# Patient Record
Sex: FEMALE | Race: WHITE | Employment: FULL TIME
[De-identification: names, ages, dates, MRNs, and addresses within clinical notes are randomized per-mention and may not be internally consistent; named-entity substitution may affect disease eponyms.]

---

## 2017-10-29 ENCOUNTER — HEALTH MAINTENANCE LETTER (OUTPATIENT)
Age: 37
End: 2017-10-29

## 2018-06-12 ENCOUNTER — RESULT FOLLOW UP (OUTPATIENT)
Dept: FAMILY MEDICINE | Facility: CLINIC | Age: 38
End: 2018-06-12

## 2018-06-12 ENCOUNTER — OFFICE VISIT (OUTPATIENT)
Dept: FAMILY MEDICINE | Facility: CLINIC | Age: 38
End: 2018-06-12
Payer: COMMERCIAL

## 2018-06-12 VITALS
HEART RATE: 71 BPM | BODY MASS INDEX: 24.43 KG/M2 | SYSTOLIC BLOOD PRESSURE: 107 MMHG | DIASTOLIC BLOOD PRESSURE: 72 MMHG | OXYGEN SATURATION: 98 % | HEIGHT: 66 IN | WEIGHT: 152 LBS | TEMPERATURE: 98.2 F

## 2018-06-12 DIAGNOSIS — F41.9 ANXIETY: ICD-10-CM

## 2018-06-12 DIAGNOSIS — Z00.00 ROUTINE GENERAL MEDICAL EXAMINATION AT A HEALTH CARE FACILITY: Primary | ICD-10-CM

## 2018-06-12 DIAGNOSIS — R87.810 CERVICAL HIGH RISK HPV (HUMAN PAPILLOMAVIRUS) TEST POSITIVE: Primary | ICD-10-CM

## 2018-06-12 DIAGNOSIS — D50.9 IRON DEFICIENCY ANEMIA, UNSPECIFIED IRON DEFICIENCY ANEMIA TYPE: ICD-10-CM

## 2018-06-12 LAB — HGB BLD-MCNC: 12.4 G/DL (ref 11.7–15.7)

## 2018-06-12 PROCEDURE — 87624 HPV HI-RISK TYP POOLED RSLT: CPT | Performed by: PHYSICIAN ASSISTANT

## 2018-06-12 PROCEDURE — 83540 ASSAY OF IRON: CPT | Performed by: PHYSICIAN ASSISTANT

## 2018-06-12 PROCEDURE — 36415 COLL VENOUS BLD VENIPUNCTURE: CPT | Performed by: PHYSICIAN ASSISTANT

## 2018-06-12 PROCEDURE — 83550 IRON BINDING TEST: CPT | Performed by: PHYSICIAN ASSISTANT

## 2018-06-12 PROCEDURE — 85018 HEMOGLOBIN: CPT | Performed by: PHYSICIAN ASSISTANT

## 2018-06-12 PROCEDURE — 82728 ASSAY OF FERRITIN: CPT | Performed by: PHYSICIAN ASSISTANT

## 2018-06-12 PROCEDURE — G0145 SCR C/V CYTO,THINLAYER,RESCR: HCPCS | Performed by: PHYSICIAN ASSISTANT

## 2018-06-12 PROCEDURE — 90471 IMMUNIZATION ADMIN: CPT | Performed by: PHYSICIAN ASSISTANT

## 2018-06-12 PROCEDURE — 99213 OFFICE O/P EST LOW 20 MIN: CPT | Mod: 25 | Performed by: PHYSICIAN ASSISTANT

## 2018-06-12 PROCEDURE — 90715 TDAP VACCINE 7 YRS/> IM: CPT | Performed by: PHYSICIAN ASSISTANT

## 2018-06-12 PROCEDURE — 99395 PREV VISIT EST AGE 18-39: CPT | Mod: 25 | Performed by: PHYSICIAN ASSISTANT

## 2018-06-12 RX ORDER — ALPRAZOLAM 0.25 MG
0.25 TABLET ORAL 3 TIMES DAILY PRN
Qty: 20 TABLET | Refills: 0 | Status: SHIPPED | OUTPATIENT
Start: 2018-06-12 | End: 2019-04-23

## 2018-06-12 ASSESSMENT — ENCOUNTER SYMPTOMS
CARDIOVASCULAR NEGATIVE: 1
EYES NEGATIVE: 1
COLOR CHANGE: 1
CONSTITUTIONAL NEGATIVE: 1
RESPIRATORY NEGATIVE: 1
MUSCULOSKELETAL NEGATIVE: 1
NEUROLOGICAL NEGATIVE: 1
GASTROINTESTINAL NEGATIVE: 1
PSYCHIATRIC NEGATIVE: 1

## 2018-06-12 NOTE — PROGRESS NOTES
SUBJECTIVE:   CC: Xin Marquez is an 37 year old woman who presents for preventive health visit.     Physical   Annual:     Getting at least 3 servings of Calcium per day::  Yes    Bi-annual eye exam::  NO    Dental care twice a year::  Yes    Sleep apnea or symptoms of sleep apnea::  None    Diet::  Breakfast skipped and Other    Frequency of exercise::  4-5 days/week    Duration of exercise::  45-60 minutes    Taking medications regularly::  Yes    Additional concerns today::  YES              Wanting refill of xanax.  Uses it for travel.  Also having more anxiety and sleep issues.  Can't sleep due to stress.  When she sleeps her anxiety goes down.  Never been on anything else for sleep.    Uses xanax very infrequently unless she is traveling but has tried a few times for sleep and it helps.        Today's PHQ-2 Score:   PHQ-2 ( 1999 Pfizer) 6/9/2018   Q1: Little interest or pleasure in doing things 0   Q2: Feeling down, depressed or hopeless 0   PHQ-2 Score 0   Q1: Little interest or pleasure in doing things Not at all   Q2: Feeling down, depressed or hopeless Not at all   PHQ-2 Score 0       Abuse: Current or Past(Physical, Sexual or Emotional)- No  Do you feel safe in your environment - Yes    Social History   Substance Use Topics     Smoking status: Never Smoker     Smokeless tobacco: Never Used     Alcohol use Yes      Comment: occasional     Alcohol Use 6/9/2018   If you drink alcohol do you typically have greater than 3 drinks per day OR greater than 7 drinks per week? No       Reviewed orders with patient.  Reviewed health maintenance and updated orders accordingly - Yes  Labs reviewed in EPIC    Mammogram not appropriate for this patient based on age.    Pertinent mammograms are reviewed under the imaging tab.  History of abnormal Pap smear: NO - age 30-65 PAP every 5 years with negative HPV co-testing recommended    Reviewed and updated as needed this visit by clinical staff  Tobacco  Allergies   "Meds  Problems  Med Hx  Surg Hx  Fam Hx  Soc Hx          Reviewed and updated as needed this visit by Provider  Allergies  Meds  Problems            Review of Systems   Constitutional: Negative.    HENT: Negative.    Eyes: Negative.    Respiratory: Negative.    Cardiovascular: Negative.    Gastrointestinal: Negative.    Genitourinary: Negative.    Musculoskeletal: Negative.    Skin: Positive for color change (mole on shoulder ).   Neurological: Negative.    Psychiatric/Behavioral: Negative.           OBJECTIVE:   /72  Pulse 71  Temp 98.2  F (36.8  C) (Oral)  Ht 5' 6\" (1.676 m)  Wt 152 lb (68.9 kg)  LMP 05/13/2018  SpO2 98%  BMI 24.53 kg/m2  Physical Exam   Constitutional: She is oriented to person, place, and time. She appears well-developed and well-nourished. No distress.   HENT:   Right Ear: External ear normal.   Left Ear: External ear normal.   Nose: Nose normal.   Mouth/Throat: Oropharynx is clear and moist. No oropharyngeal exudate.   Eyes: Conjunctivae are normal. Pupils are equal, round, and reactive to light. Right eye exhibits no discharge. Left eye exhibits no discharge.   Neck: Neck supple. No tracheal deviation present. No thyromegaly present.   Cardiovascular: Normal rate, regular rhythm, S1 normal, S2 normal, normal heart sounds and normal pulses.  Exam reveals no S3, no S4 and no friction rub.    No murmur heard.  Pulmonary/Chest: Effort normal and breath sounds normal. No respiratory distress. She has no wheezes. She has no rales.   Abdominal: Soft. Bowel sounds are normal. She exhibits no mass. There is no hepatosplenomegaly. There is no tenderness.   Genitourinary: Vagina normal and uterus normal. No breast swelling, tenderness or discharge. Cervix exhibits no motion tenderness and no discharge. No vaginal discharge found.   Musculoskeletal: Normal range of motion. She exhibits no edema.   Lymphadenopathy:     She has no cervical adenopathy.   Neurological: She is alert and " "oriented to person, place, and time. She has normal strength and normal reflexes. She exhibits normal muscle tone.   Skin: Skin is warm and dry. No rash noted.   Psychiatric: She has a normal mood and affect. Judgment and thought content normal. Cognition and memory are normal.         ASSESSMENT/PLAN:   1. Routine general medical examination at a health care facility    - TDAP VACCINE (ADACEL)  - VACCINE ADMINISTRATION, INITIAL  - Pap imaged thin layer screen with HPV - recommended age 30 - 65 years (select HPV order below)  - HPV High Risk Types DNA Cervical    2. Anxiety  Ok to use sparingly for sleep.  If using frequently discussed trying trazodone at night.    - ALPRAZolam (XANAX) 0.25 MG tablet; Take 1 tablet (0.25 mg) by mouth 3 times daily as needed for anxiety  Dispense: 20 tablet; Refill: 0    3. Iron deficiency anemia, unspecified iron deficiency anemia type  Follow up as needed  - Hemoglobin  - Iron and iron binding capacity  - Ferritin    COUNSELING:  Reviewed preventive health counseling, as reflected in patient instructions       Regular exercise       Healthy diet/nutrition         reports that she has never smoked. She has never used smokeless tobacco.    Estimated body mass index is 24.53 kg/(m^2) as calculated from the following:    Height as of this encounter: 5' 6\" (1.676 m).    Weight as of this encounter: 152 lb (68.9 kg).       Counseling Resources:  ATP IV Guidelines  Pooled Cohorts Equation Calculator  Breast Cancer Risk Calculator  FRAX Risk Assessment  ICSI Preventive Guidelines  Dietary Guidelines for Americans, 2010  USDA's MyPlate  ASA Prophylaxis  Lung CA Screening    Makenna Dyer PA-C  [facility]  Answers for HPI/ROS submitted by the patient on 6/9/2018   PHQ-2 Score: 0    "

## 2018-06-12 NOTE — NURSING NOTE
Screening Questionnaire for Adult Immunization    Are you sick today?   No   Do you have allergies to medications, food, a vaccine component or latex?   No   Have you ever had a serious reaction after receiving a vaccination?   No   Do you have a long-term health problem with heart disease, lung disease, asthma, kidney disease, metabolic disease (e.g. diabetes), anemia, or other blood disorder?   No   Do you have cancer, leukemia, HIV/AIDS, or any other immune system problem?   No   In the past 3 months, have you taken medications that affect  your immune system, such as prednisone, other steroids, or anticancer drugs; drugs for the treatment of rheumatoid arthritis, Crohn s disease, or psoriasis; or have you had radiation treatments?   No   Have you had a seizure, or a brain or other nervous system problem?   No   During the past year, have you received a transfusion of blood or blood     products, or been given immune (gamma) globulin or antiviral drug?   No   For women: Are you pregnant or is there a chance you could become        pregnant during the next month?   No   Have you received any vaccinations in the past 4 weeks?   No     Immunization questionnaire answers were all negative.        Per orders of  Gomez , injection of Tdap  given by Shelley Snell. Patient instructed to remain in clinic for 15 minutes afterwards, and to report any adverse reaction to me immediately.       Screening performed by Shelley Snell on 6/12/2018 at 4:32 PM.

## 2018-06-12 NOTE — MR AVS SNAPSHOT
After Visit Summary   6/12/2018    Xin Marquez    MRN: [id number]           Patient Information     Date Of Birth          1980        Visit Information        Provider Department      6/12/2018 3:20 PM Makenna Dyer PA-C [facility]        Today's Diagnoses     Iron deficiency anemia, unspecified iron deficiency anemia type    -  1    Anxiety        Routine general medical examination at a health care facility          Care Instructions      Preventive Health Recommendations  Female Ages 26 - 39  Yearly exam:   See your health care provider every year in order to    Review health changes.     Discuss preventive care.      Review your medicines if you your doctor has prescribed any.    Until age 30: Get a Pap test every three years (more often if you have had an abnormal result).    After age 30: Talk to your doctor about whether you should have a Pap test every 3 years or have a Pap test with HPV screening every 5 years.   You do not need a Pap test if your uterus was removed (hysterectomy) and you have not had cancer.  You should be tested each year for STDs (sexually transmitted diseases), if you're at risk.   Talk to your provider about how often to have your cholesterol checked.  If you are at risk for diabetes, you should have a diabetes test (fasting glucose).  Shots: Get a flu shot each year. Get a tetanus shot every 10 years.   Nutrition:     Eat at least 5 servings of fruits and vegetables each day.    Eat whole-grain bread, whole-wheat pasta and brown rice instead of white grains and rice.    Talk to your provider about Calcium and Vitamin D.     Lifestyle    Exercise at least 150 minutes a week (30 minutes a day, 5 days of the week). This will help you control your weight and prevent disease.    Limit alcohol to one drink per day.    No smoking.     Wear sunscreen to prevent skin cancer.    See your dentist every six months for an exam and  "cleaning.            Follow-ups after your visit        Follow-up notes from your care team     Return in about 1 year (around 6/12/2019) for Physical Exam.      Who to contact     If you have questions or need follow up information about today's clinic visit or your schedule please contact [facility] directly at [phone].  Normal or non-critical lab and imaging results will be communicated to you by MyChart, letter or phone within 4 business days after the clinic has received the results. If you do not hear from us within 7 days, please contact the clinic through [facility] or phone. If you have a critical or abnormal lab result, we will notify you by phone as soon as possible.  Submit refill requests through [employer] or call your pharmacy and they will forward the refill request to us. Please allow 3 business days for your refill to be completed.          Additional Information About Your Visit        MyChart Information     [employer] gives you secure access to your electronic health record. If you see a primary care provider, you can also send messages to your care team and make appointments. If you have questions, please call your primary care clinic.  If you do not have a primary care provider, please call [phone] and they will assist you.        Care EveryWhere ID     This is your Care EveryWhere ID. This could be used by other organizations to access your [place] medical records  [id number]        Your Vitals Were     Pulse Temperature Height Last Period Pulse Oximetry BMI (Body Mass Index)    71 98.2  F (36.8  C) (Oral) 5' 6\" (1.676 m) 05/13/2018 98% 24.53 kg/m2       Blood Pressure from Last 3 Encounters:   06/12/18 107/72   09/16/16 106/61   08/12/15 104/70    Weight from Last 3 Encounters:   06/12/18 152 lb (68.9 kg)   09/16/16 156 lb (70.8 kg)   08/12/15 151 lb (68.5 kg)              We Performed the Following     Ferritin     Hemoglobin     HPV High Risk Types DNA Cervical  "    Iron and iron binding capacity     Pap imaged thin layer screen with HPV - recommended age 30 - 65 years (select HPV order below)     TDAP VACCINE (ADACEL)     VACCINE ADMINISTRATION, INITIAL          Where to get your medicines      Some of these will need a paper prescription and others can be bought over the counter.  Ask your nurse if you have questions.     Bring a paper prescription for each of these medications     ALPRAZolam 0.25 MG tablet          Primary Care Provider Fax #    Physician No Ref-Primary [phone]       No address on file        Equal Access to Services     BEBO LAWSON : Hadii aad ku hadasho Soomaali, waaxda luqadaha, qaybta kaalmada adeegyada, waxay daniin haycarlos trivedi . [facility] [phone].    ATENCIÓN: Si habla español, tiene a bolaños disposición servicios gratuitos de asistencia lingüística. [facility] [phone].    We comply with applicable federal civil rights laws and [place] laws. We do not discriminate on the basis of race, color, national origin, age, disability, sex, sexual orientation, or gender identity.            Thank you!     Thank you for choosing [facility]  for your care. Our goal is always to provide you with excellent care. Hearing back from our patients is one way we can continue to improve our services. Please take a few minutes to complete the written survey that you may receive in the mail after your visit with us. Thank you!             Your Updated Medication List - Protect others around you: Learn how to safely use, store and throw away your medicines at www.disposemymeds.org.          This list is accurate as of 6/12/18  4:25 PM.  Always use your most recent med list.                   Brand Name Dispense Instructions for use Diagnosis    ALPRAZolam 0.25 MG tablet    XANAX    20 tablet    Take 1 tablet (0.25 mg) by mouth 3 times daily as needed for anxiety    Anxiety       IRON PO           ZYRTEC PO

## 2018-06-12 NOTE — LETTER
May 28, 2019      Xin Alma  [address] [address]    Dear ,      At [facility], your health and wellness is our primary concern. That is why we are following up on a positive high risk HPV test from 6/12/18. Your provider had recommended that you have a Pap smear and HPV test completed by 6/12/19. Our records do not show that this has been scheduled.    It is important to complete the follow up that your provider has suggested for you to ensure that there are no worsening changes which may, over time, develop into cancer.      Please contact our office at  [phone] to schedule an appointment for a Pap smear and HPV test at your earliest convenience. If you have questions or concerns, please call the clinic and we will be happy to assist you.    If you have completed the tests outside of [facility], please have the results forwarded to our office. We will update the chart for your primary Physician to review before your next annual physical.     Thank you for choosing [facility]!    Sincerely,      Your [facility] Care Team/shana

## 2018-06-13 LAB
FERRITIN SERPL-MCNC: 56 NG/ML (ref 12–150)
IRON SATN MFR SERPL: 38 % (ref 15–46)
IRON SERPL-MCNC: 100 UG/DL (ref 35–180)
TIBC SERPL-MCNC: 262 UG/DL (ref 240–430)

## 2018-06-14 LAB
COPATH REPORT: NORMAL
PAP: NORMAL

## 2018-06-18 LAB
FINAL DIAGNOSIS: ABNORMAL
HPV HR 12 DNA CVX QL NAA+PROBE: POSITIVE
HPV16 DNA SPEC QL NAA+PROBE: NEGATIVE
HPV18 DNA SPEC QL NAA+PROBE: NEGATIVE
SPECIMEN DESCRIPTION: ABNORMAL
SPECIMEN SOURCE CVX/VAG CYTO: ABNORMAL

## 2018-06-19 PROBLEM — R87.810 CERVICAL HIGH RISK HPV (HUMAN PAPILLOMAVIRUS) TEST POSITIVE: Status: ACTIVE | Noted: 2018-06-12

## 2018-06-19 NOTE — PROGRESS NOTES
6/12/18 NIL pap, + HR HPV (not 16 or 18) Plan: cotest in 1 yr.  5/28/19 Cotest reminder letter sent (rlm)  7/12/19 Pap: NIL/neg HR HPV. Plan cotest in 3 years. Tracking closed/ck

## 2019-04-23 ENCOUNTER — OFFICE VISIT (OUTPATIENT)
Dept: FAMILY MEDICINE | Facility: CLINIC | Age: 39
End: 2019-04-23
Payer: COMMERCIAL

## 2019-04-23 VITALS
BODY MASS INDEX: 24.91 KG/M2 | DIASTOLIC BLOOD PRESSURE: 79 MMHG | OXYGEN SATURATION: 98 % | WEIGHT: 155 LBS | HEIGHT: 66 IN | HEART RATE: 50 BPM | TEMPERATURE: 97.7 F | SYSTOLIC BLOOD PRESSURE: 137 MMHG

## 2019-04-23 DIAGNOSIS — T14.8XXA HEMATOMA OF SKIN: Primary | ICD-10-CM

## 2019-04-23 DIAGNOSIS — F41.9 ANXIETY: ICD-10-CM

## 2019-04-23 DIAGNOSIS — D50.9 IRON DEFICIENCY ANEMIA, UNSPECIFIED IRON DEFICIENCY ANEMIA TYPE: ICD-10-CM

## 2019-04-23 DIAGNOSIS — R14.0 BLOATING: ICD-10-CM

## 2019-04-23 LAB
BASOPHILS # BLD AUTO: 0 10E9/L (ref 0–0.2)
BASOPHILS NFR BLD AUTO: 0.6 %
DIFFERENTIAL METHOD BLD: NORMAL
EOSINOPHIL # BLD AUTO: 0.1 10E9/L (ref 0–0.7)
EOSINOPHIL NFR BLD AUTO: 2.2 %
ERYTHROCYTE [DISTWIDTH] IN BLOOD BY AUTOMATED COUNT: 13.2 % (ref 10–15)
FERRITIN SERPL-MCNC: 56 NG/ML (ref 12–150)
HCT VFR BLD AUTO: 40.9 % (ref 35–47)
HGB BLD-MCNC: 13.6 G/DL (ref 11.7–15.7)
IRON SATN MFR SERPL: 41 % (ref 15–46)
IRON SERPL-MCNC: 115 UG/DL (ref 35–180)
LYMPHOCYTES # BLD AUTO: 1.6 10E9/L (ref 0.8–5.3)
LYMPHOCYTES NFR BLD AUTO: 32.9 %
MCH RBC QN AUTO: 30.5 PG (ref 26.5–33)
MCHC RBC AUTO-ENTMCNC: 33.3 G/DL (ref 31.5–36.5)
MCV RBC AUTO: 92 FL (ref 78–100)
MONOCYTES # BLD AUTO: 0.4 10E9/L (ref 0–1.3)
MONOCYTES NFR BLD AUTO: 7.6 %
NEUTROPHILS # BLD AUTO: 2.8 10E9/L (ref 1.6–8.3)
NEUTROPHILS NFR BLD AUTO: 56.7 %
PLATELET # BLD AUTO: 288 10E9/L (ref 150–450)
RBC # BLD AUTO: 4.46 10E12/L (ref 3.8–5.2)
TIBC SERPL-MCNC: 281 UG/DL (ref 240–430)
WBC # BLD AUTO: 5 10E9/L (ref 4–11)

## 2019-04-23 PROCEDURE — 82728 ASSAY OF FERRITIN: CPT | Performed by: PHYSICIAN ASSISTANT

## 2019-04-23 PROCEDURE — 83516 IMMUNOASSAY NONANTIBODY: CPT | Performed by: PHYSICIAN ASSISTANT

## 2019-04-23 PROCEDURE — 85025 COMPLETE CBC W/AUTO DIFF WBC: CPT | Performed by: PHYSICIAN ASSISTANT

## 2019-04-23 PROCEDURE — 83540 ASSAY OF IRON: CPT | Performed by: PHYSICIAN ASSISTANT

## 2019-04-23 PROCEDURE — 36415 COLL VENOUS BLD VENIPUNCTURE: CPT | Performed by: PHYSICIAN ASSISTANT

## 2019-04-23 PROCEDURE — 99214 OFFICE O/P EST MOD 30 MIN: CPT | Performed by: PHYSICIAN ASSISTANT

## 2019-04-23 PROCEDURE — 83516 IMMUNOASSAY NONANTIBODY: CPT | Mod: 91 | Performed by: PHYSICIAN ASSISTANT

## 2019-04-23 PROCEDURE — 83550 IRON BINDING TEST: CPT | Performed by: PHYSICIAN ASSISTANT

## 2019-04-23 RX ORDER — ALPRAZOLAM 0.25 MG
0.25 TABLET ORAL 3 TIMES DAILY PRN
Qty: 20 TABLET | Refills: 0 | Status: SHIPPED | OUTPATIENT
Start: 2019-04-23 | End: 2021-09-28

## 2019-04-23 ASSESSMENT — MIFFLIN-ST. JEOR: SCORE: 1389.83

## 2019-04-23 NOTE — PROGRESS NOTES
"  SUBJECTIVE:   Xin Marquez is a 38 year old female who presents to clinic today for the following   health issues:    R abdominal bruise,lump and mild pain x 10 days, no know injury   Did do a pilate's class the day before.    Does bruise easily.      Gained weight in last month or so  More night sweats in the last year-seem more related to cycle, worse before period  Feels bloated most times.   Modified diet to help.    Always anemic.  Never had imaging.    Periods are always irregular -sometimes skips periods.    Sometimes gets irregular heart rate.  More recently in the last 5 years.  Skips a beat.    Normal appetite, continues to run regularly.  Has no cardiac symptoms when running.  Has had an echo in past that was normal.              Additional history: as documented    Reviewed  and updated as needed this visit by clinical staff  Tobacco  Allergies  Meds  Med Hx  Surg Hx  Fam Hx  Soc Hx        Reviewed and updated as needed this visit by Provider         Patient Active Problem List   Diagnosis     CARDIOVASCULAR SCREENING; LDL GOAL LESS THAN 160     Eczema     Anxiety     Iron deficiency anemia, unspecified iron deficiency anemia type     Cervical high risk HPV (human papillomavirus) test positive     Past Surgical History:   Procedure Laterality Date     GYN SURGERY             Social History     Tobacco Use     Smoking status: Never Smoker     Smokeless tobacco: Never Used   Substance Use Topics     Alcohol use: Yes     Comment: occasional     Family History   Problem Relation Age of Onset     Hypertension Father      Heart Disease Maternal Grandfather      Eye Disorder Paternal Grandmother      Thyroid Disease Mother            ROS:  As above    OBJECTIVE:     /79   Pulse 50   Temp 97.7  F (36.5  C) (Oral)   Ht 1.676 m (5' 6\")   Wt 70.3 kg (155 lb)   SpO2 98%   BMI 25.02 kg/m    Body mass index is 25.02 kg/m .  GENERAL: healthy, alert and no distress  RESP: lungs clear to " auscultation - no rales, rhonchi or wheezes  CV: regular rates and rhythm, normal S1 S2, no S3 or S4 and no murmur, click or rub  ABDOMEN: tenderness RLQ, bowel sounds normal and mass firm superficial about golf ball size mass in right groin with surrounding healing bruising     Diagnostic Test Results:  none     ASSESSMENT/PLAN:       1. Anxiety  Doing well.  Mainly uses for travel.  refilled  - ALPRAZolam (XANAX) 0.25 MG tablet; Take 1 tablet (0.25 mg) by mouth 3 times daily as needed for anxiety For travel and PRN  Dispense: 20 tablet; Refill: 0    2. Hematoma of skin  Given other symptoms will get an ultrasound to confirm.    - US Pelvic Complete with Transvaginal; Future    3. Bloating  As above.  If labs normal consider colonoscopy   - Tissue transglutaminase poly IgA and IgG  - US Pelvic Complete with Transvaginal; Future    4. Iron deficiency anemia, unspecified iron deficiency anemia type  As above  - CBC with platelets and differential  - Iron and iron binding capacity  - Ferritin        Makenna Dyer PA-C  [facility]

## 2019-04-24 LAB
TTG IGA SER-ACNC: <1 U/ML
TTG IGG SER-ACNC: <1 U/ML

## 2019-07-12 ENCOUNTER — OFFICE VISIT (OUTPATIENT)
Dept: FAMILY MEDICINE | Facility: CLINIC | Age: 39
End: 2019-07-12

## 2019-07-12 VITALS
DIASTOLIC BLOOD PRESSURE: 72 MMHG | OXYGEN SATURATION: 98 % | BODY MASS INDEX: 25.34 KG/M2 | SYSTOLIC BLOOD PRESSURE: 108 MMHG | TEMPERATURE: 97 F | WEIGHT: 157 LBS | HEART RATE: 58 BPM

## 2019-07-12 DIAGNOSIS — J45.20 MILD INTERMITTENT ASTHMA, UNSPECIFIED WHETHER COMPLICATED: ICD-10-CM

## 2019-07-12 DIAGNOSIS — Z00.00 ROUTINE GENERAL MEDICAL EXAMINATION AT A HEALTH CARE FACILITY: Primary | ICD-10-CM

## 2019-07-12 PROCEDURE — 99396 PREV VISIT EST AGE 40-64: CPT | Performed by: PHYSICIAN ASSISTANT

## 2019-07-12 PROCEDURE — 87624 HPV HI-RISK TYP POOLED RSLT: CPT | Performed by: PHYSICIAN ASSISTANT

## 2019-07-12 PROCEDURE — 88175 CYTOPATH C/V AUTO FLUID REDO: CPT | Performed by: PHYSICIAN ASSISTANT

## 2019-07-12 RX ORDER — ALBUTEROL SULFATE 90 UG/1
2 AEROSOL, METERED RESPIRATORY (INHALATION) EVERY 6 HOURS
Qty: 18 G | Refills: 1 | Status: SHIPPED | OUTPATIENT
Start: 2019-07-12 | End: 2019-07-12

## 2019-07-12 RX ORDER — ALBUTEROL SULFATE 90 UG/1
2 AEROSOL, METERED RESPIRATORY (INHALATION) EVERY 6 HOURS
Qty: 18 G | Refills: 1 | Status: SHIPPED | OUTPATIENT
Start: 2019-07-12 | End: 2022-01-31

## 2019-07-12 ASSESSMENT — ENCOUNTER SYMPTOMS
ABDOMINAL PAIN: 0
HEARTBURN: 0
HEMATOCHEZIA: 0
SORE THROAT: 0
FEVER: 0
HEMATURIA: 0
BREAST MASS: 0
JOINT SWELLING: 0
SHORTNESS OF BREATH: 1
DIZZINESS: 0
MYALGIAS: 0
ARTHRALGIAS: 0
PALPITATIONS: 0
FREQUENCY: 0
CHILLS: 0
DYSURIA: 0
WEAKNESS: 0
CONSTIPATION: 0
NERVOUS/ANXIOUS: 0
HEADACHES: 0
NAUSEA: 0
COUGH: 0
EYE PAIN: 0
PARESTHESIAS: 0
DIARRHEA: 0

## 2019-07-12 NOTE — PROGRESS NOTES
SUBJECTIVE:   CC: Xin Marquez is an 38 year old woman who presents for preventive health visit.     Healthy Habits:     Getting at least 3 servings of Calcium per day:  Yes    Bi-annual eye exam:  NO    Dental care twice a year:  NO    Sleep apnea or symptoms of sleep apnea:  None    Diet:  Carbohydrate counting, Breakfast skipped and Other    Frequency of exercise:  2-3 days/week    Duration of exercise:  45-60 minutes    Taking medications regularly:  Yes    Medication side effects:  Not applicable    PHQ-2 Total Score: 0    Additional concerns today:  Yes        Asthma discussion,having SOB mild wheezing.  Has a hx of asthma.  Hasn't had an inhaler for a while but this summer has noticed more symptoms.  Albuterol is the only inhaler she had.  Exercise triggers.          Today's PHQ-2 Score:   PHQ-2 ( 1999 Pfizer) 7/12/2019   Q1: Little interest or pleasure in doing things 0   Q2: Feeling down, depressed or hopeless 0   PHQ-2 Score 0   Q1: Little interest or pleasure in doing things Not at all   Q2: Feeling down, depressed or hopeless Not at all   PHQ-2 Score 0       Abuse: Current or Past(Physical, Sexual or Emotional)- No  Do you feel safe in your environment? Yes    Social History     Tobacco Use     Smoking status: Never Smoker     Smokeless tobacco: Never Used   Substance Use Topics     Alcohol use: Yes     Comment: occasional         Alcohol Use 7/12/2019   Prescreen: >3 drinks/day or >7 drinks/week? No       Reviewed orders with patient.  Reviewed health maintenance and updated orders accordingly - Yes  Labs reviewed in [facility]    Mammogram Screening: Patient under age 50, mutual decision reflected in health maintenance.      Pertinent mammograms are reviewed under the imaging tab.  History of abnormal Pap smear: YES - other categories - see link Cervical Cytology Screening Guidelines  PAP / HPV Latest Ref Rng & Units 6/12/2018   PAP - NIL   HPV 16 DNA NEG:Negative Negative   HPV 18 DNA NEG:Negative  Negative   OTHER HR HPV NEG:Negative Positive(A)     Reviewed and updated as needed this visit by clinical staff  Tobacco  Allergies  Meds  Med Hx  Surg Hx  Fam Hx  Soc Hx        Reviewed and updated as needed this visit by Provider  Allergies  Meds            Review of Systems   Constitutional: Negative for chills and fever.   HENT: Negative for congestion, ear pain, hearing loss and sore throat.    Eyes: Negative for pain and visual disturbance.   Respiratory: Positive for shortness of breath. Negative for cough.    Cardiovascular: Negative for chest pain, palpitations and peripheral edema.   Gastrointestinal: Negative for abdominal pain, constipation, diarrhea, heartburn, hematochezia and nausea.   Breasts:  Negative for tenderness, breast mass and discharge.   Genitourinary: Negative for dysuria, frequency, genital sores, hematuria, pelvic pain, urgency, vaginal bleeding and vaginal discharge.   Musculoskeletal: Negative for arthralgias, joint swelling and myalgias.   Skin: Negative for rash.   Neurological: Negative for dizziness, weakness, headaches and paresthesias.   Psychiatric/Behavioral: Negative for mood changes. The patient is not nervous/anxious.           OBJECTIVE:   /72   Pulse 58   Temp 97  F (36.1  C) (Tympanic)   Wt 71.2 kg (157 lb)   LMP 06/26/2019 (Exact Date)   SpO2 98%   BMI 25.34 kg/m    Physical Exam   Constitutional: She is oriented to person, place, and time. She appears well-developed and well-nourished.   HENT:   Right Ear: External ear normal.   Left Ear: External ear normal.   Mouth/Throat: Oropharynx is clear and moist.   Eyes: Pupils are equal, round, and reactive to light. EOM are normal.   Neck: No thyromegaly present.   Cardiovascular: Normal rate, regular rhythm and normal heart sounds.   Pulmonary/Chest: Effort normal and breath sounds normal. Right breast exhibits no mass, no nipple discharge, no skin change and no tenderness. Left breast exhibits no mass,  "no nipple discharge, no skin change and no tenderness.   Abdominal: Soft. Bowel sounds are normal. There is no tenderness.   Genitourinary: Vagina normal and uterus normal. Cervix exhibits no discharge. Right adnexum displays no mass. Left adnexum displays no mass.   Musculoskeletal: She exhibits no edema.   Lymphadenopathy:     She has no cervical adenopathy.   Neurological: She is alert and oriented to person, place, and time.   Skin: Skin is warm and dry. No rash noted.   Psychiatric: She has a normal mood and affect.         Diagnostic Test Results:  Labs reviewed in Epic    ASSESSMENT/PLAN:   1. Routine general medical examination at a health care facility    - Pap imaged thin layer diagnostic with HPV (select HPV order below)  - HPV High Risk Types DNA Cervical    2. Mild intermittent asthma, unspecified whether complicated  Exercise only.  Refilled   - albuterol (PROAIR HFA/PROVENTIL HFA/VENTOLIN HFA) 108 (90 Base) MCG/ACT inhaler; Inhale 2 puffs into the lungs every 6 hours  Dispense: 18 g; Refill: 1    COUNSELING:  Reviewed preventive health counseling, as reflected in patient instructions       Regular exercise       Healthy diet/nutrition    Estimated body mass index is 25.34 kg/m  as calculated from the following:    Height as of 4/23/19: 1.676 m (5' 6\").    Weight as of this encounter: 71.2 kg (157 lb).    Weight management plan: continue exercise      reports that she has never smoked. She has never used smokeless tobacco.      Counseling Resources:  ATP IV Guidelines  Pooled Cohorts Equation Calculator  Breast Cancer Risk Calculator  FRAX Risk Assessment  ICSI Preventive Guidelines  Dietary Guidelines for Americans, 2010  USDA's MyPlate  ASA Prophylaxis  Lung CA Screening    Makenna Dyer PA-C  [facility]    ms in a month   "

## 2019-07-17 LAB
COPATH REPORT: NORMAL
PAP: NORMAL

## 2019-07-18 LAB
FINAL DIAGNOSIS: NORMAL
HPV HR 12 DNA CVX QL NAA+PROBE: NEGATIVE
HPV16 DNA SPEC QL NAA+PROBE: NEGATIVE
HPV18 DNA SPEC QL NAA+PROBE: NEGATIVE
SPECIMEN DESCRIPTION: NORMAL
SPECIMEN SOURCE CVX/VAG CYTO: NORMAL

## 2019-08-16 ENCOUNTER — TELEPHONE (OUTPATIENT)
Dept: FAMILY MEDICINE | Facility: CLINIC | Age: 39
End: 2019-08-16

## 2019-08-16 NOTE — TELEPHONE ENCOUNTER
Reason for Call:  Other returning call    Detailed comments: Patient returning call, please advise.     Phone Number Patient can be reached at: Home number on file [phone] (home)    Best Time: Anytime    Can we leave a detailed message on this number? YES    Call taken on 8/16/2019 at 3:19 PM by Nara Patel

## 2019-08-17 ASSESSMENT — ASTHMA QUESTIONNAIRES: ACT_TOTALSCORE: 22

## 2019-08-19 NOTE — TELEPHONE ENCOUNTER
Noted.  No need to change medications.  Can follow up in 6 months for asthma.    Makenna Dyer PA-C

## 2020-03-01 ENCOUNTER — HEALTH MAINTENANCE LETTER (OUTPATIENT)
Age: 40
End: 2020-03-01

## 2020-12-14 ENCOUNTER — HEALTH MAINTENANCE LETTER (OUTPATIENT)
Age: 40
End: 2020-12-14

## 2021-09-27 ENCOUNTER — LAB REQUISITION (OUTPATIENT)
Dept: LAB | Facility: CLINIC | Age: 41
End: 2021-09-27
Payer: COMMERCIAL

## 2021-09-27 DIAGNOSIS — Z11.59 ENCOUNTER FOR SCREENING FOR OTHER VIRAL DISEASES: ICD-10-CM

## 2021-09-27 PROCEDURE — U0003 INFECTIOUS AGENT DETECTION BY NUCLEIC ACID (DNA OR RNA); SEVERE ACUTE RESPIRATORY SYNDROME CORONAVIRUS 2 (SARS-COV-2) (CORONAVIRUS DISEASE [COVID-19]), AMPLIFIED PROBE TECHNIQUE, MAKING USE OF HIGH THROUGHPUT TECHNOLOGIES AS DESCRIBED BY CMS-2020-01-R: HCPCS | Mod: ORL | Performed by: STUDENT IN AN ORGANIZED HEALTH CARE EDUCATION/TRAINING PROGRAM

## 2021-09-27 PROCEDURE — U0003 INFECTIOUS AGENT DETECTION BY NUCLEIC ACID (DNA OR RNA); SEVERE ACUTE RESPIRATORY SYNDROME CORONAVIRUS 2 (SARS-COV-2) (CORONAVIRUS DISEASE [COVID-19]), AMPLIFIED PROBE TECHNIQUE, MAKING USE OF HIGH THROUGHPUT TECHNOLOGIES AS DESCRIBED BY CMS-2020-01-R: HCPCS | Mod: ORL

## 2021-09-28 ENCOUNTER — OFFICE VISIT (OUTPATIENT)
Dept: FAMILY MEDICINE | Facility: CLINIC | Age: 41
End: 2021-09-28
Payer: COMMERCIAL

## 2021-09-28 VITALS
DIASTOLIC BLOOD PRESSURE: 80 MMHG | HEART RATE: 65 BPM | TEMPERATURE: 97.9 F | BODY MASS INDEX: 25.07 KG/M2 | HEIGHT: 66 IN | SYSTOLIC BLOOD PRESSURE: 123 MMHG | WEIGHT: 156 LBS

## 2021-09-28 DIAGNOSIS — J34.2 DEVIATED NASAL SEPTUM: ICD-10-CM

## 2021-09-28 DIAGNOSIS — Z01.818 PREOP GENERAL PHYSICAL EXAM: Primary | ICD-10-CM

## 2021-09-28 DIAGNOSIS — E61.1 IRON DEFICIENCY: ICD-10-CM

## 2021-09-28 DIAGNOSIS — H90.12 CONDUCTIVE HEARING LOSS OF LEFT EAR, UNSPECIFIED HEARING STATUS ON CONTRALATERAL SIDE: ICD-10-CM

## 2021-09-28 DIAGNOSIS — Z01.818 PREOP EXAMINATION: ICD-10-CM

## 2021-09-28 DIAGNOSIS — F41.9 ANXIETY: ICD-10-CM

## 2021-09-28 DIAGNOSIS — Z23 NEED FOR PROPHYLACTIC VACCINATION AND INOCULATION AGAINST INFLUENZA: ICD-10-CM

## 2021-09-28 DIAGNOSIS — H83.8X2 AUTOIMMUNE DISORDER OF LEFT INNER EAR: ICD-10-CM

## 2021-09-28 DIAGNOSIS — Z11.4 SCREENING FOR HIV (HUMAN IMMUNODEFICIENCY VIRUS): ICD-10-CM

## 2021-09-28 DIAGNOSIS — Z11.59 NEED FOR HEPATITIS C SCREENING TEST: ICD-10-CM

## 2021-09-28 PROBLEM — D50.9 IRON DEFICIENCY ANEMIA, UNSPECIFIED IRON DEFICIENCY ANEMIA TYPE: Status: RESOLVED | Noted: 2018-06-12 | Resolved: 2021-09-28

## 2021-09-28 LAB
BASOPHILS # BLD AUTO: 0.1 10E3/UL (ref 0–0.2)
BASOPHILS NFR BLD AUTO: 1 %
EOSINOPHIL # BLD AUTO: 0.1 10E3/UL (ref 0–0.7)
EOSINOPHIL NFR BLD AUTO: 2 %
ERYTHROCYTE [DISTWIDTH] IN BLOOD BY AUTOMATED COUNT: 13.1 % (ref 10–15)
FERRITIN SERPL-MCNC: 89 NG/ML (ref 12–150)
HCT VFR BLD AUTO: 40.2 % (ref 35–47)
HGB BLD-MCNC: 13.6 G/DL (ref 11.7–15.7)
LYMPHOCYTES # BLD AUTO: 1.9 10E3/UL (ref 0.8–5.3)
LYMPHOCYTES NFR BLD AUTO: 30 %
MCH RBC QN AUTO: 30.7 PG (ref 26.5–33)
MCHC RBC AUTO-ENTMCNC: 33.8 G/DL (ref 31.5–36.5)
MCV RBC AUTO: 91 FL (ref 78–100)
MONOCYTES # BLD AUTO: 0.4 10E3/UL (ref 0–1.3)
MONOCYTES NFR BLD AUTO: 7 %
NEUTROPHILS # BLD AUTO: 3.9 10E3/UL (ref 1.6–8.3)
NEUTROPHILS NFR BLD AUTO: 62 %
PLATELET # BLD AUTO: 320 10E3/UL (ref 150–450)
RBC # BLD AUTO: 4.43 10E6/UL (ref 3.8–5.2)
SARS-COV-2 RNA RESP QL NAA+PROBE: NEGATIVE
WBC # BLD AUTO: 6.3 10E3/UL (ref 4–11)

## 2021-09-28 PROCEDURE — 85025 COMPLETE CBC W/AUTO DIFF WBC: CPT | Performed by: PHYSICIAN ASSISTANT

## 2021-09-28 PROCEDURE — 90686 IIV4 VACC NO PRSV 0.5 ML IM: CPT | Performed by: PHYSICIAN ASSISTANT

## 2021-09-28 PROCEDURE — 82728 ASSAY OF FERRITIN: CPT | Performed by: PHYSICIAN ASSISTANT

## 2021-09-28 PROCEDURE — 90471 IMMUNIZATION ADMIN: CPT | Performed by: PHYSICIAN ASSISTANT

## 2021-09-28 PROCEDURE — 99214 OFFICE O/P EST MOD 30 MIN: CPT | Mod: 25 | Performed by: PHYSICIAN ASSISTANT

## 2021-09-28 PROCEDURE — 36415 COLL VENOUS BLD VENIPUNCTURE: CPT | Performed by: PHYSICIAN ASSISTANT

## 2021-09-28 PROCEDURE — 87389 HIV-1 AG W/HIV-1&-2 AB AG IA: CPT | Performed by: PHYSICIAN ASSISTANT

## 2021-09-28 PROCEDURE — 86803 HEPATITIS C AB TEST: CPT | Performed by: PHYSICIAN ASSISTANT

## 2021-09-28 RX ORDER — ALPRAZOLAM 0.25 MG
0.25 TABLET ORAL 3 TIMES DAILY PRN
Qty: 20 TABLET | Refills: 0 | Status: SHIPPED | OUTPATIENT
Start: 2021-09-28 | End: 2023-07-24

## 2021-09-28 ASSESSMENT — MIFFLIN-ST. JEOR: SCORE: 1379.36

## 2021-09-28 NOTE — PROGRESS NOTES
[facility]  [address] [place] [address]  [place] [address]  Phone: [phone]  Primary Provider: Manjit Dyer  Pre-op Performing Provider: MANJIT DYER      PREOPERATIVE EVALUATION:  Today's date: 9/28/2021    Xin Marquez is a 41 year old female who presents for a preoperative evaluation.    Surgical Information:  Surgery/Procedure: nose -deviated nasal septum             2nd surgery L ear   Surgery Location: 1st surgery  [facility]            2nd surgery [facility] [place]     Surgeon :Dr. Orellana 1st surgery                                       -2nd surgery    Surgery Date: 1st surgery  9-29-21                                     2nd  surgery is 10-21-21   Time of Surgery:   11:30 am   Where patient plans to recover: At home with family  Fax number for surgical facility: 1st surgery [phone]        2nd surgery fax [phone]     Type of Anesthesia Anticipated: general     Assessment & Plan     The proposed surgical procedure is considered INTERMEDIATE risk.    Preop general physical exam  Pt does not have anemia but low iron.  Labs rechecked today   - Hemoglobin; Future  - Ferritin; Future  - Ferritin    Deviated nasal septum  Ok for surgery tomorrow     Conductive hearing loss of left ear, unspecified hearing status on contralateral side  As above    Autoimmune disorder of left inner ear  As above    Anxiety  With travel.  refilled  - ALPRAZolam (XANAX) 0.25 MG tablet; Take 1 tablet (0.25 mg) by mouth 3 times daily as needed for anxiety For travel and PRN    Screening for HIV (human immunodeficiency virus)    - HIV Antigen Antibody Combo; Future  - HIV Antigen Antibody Combo    Need for hepatitis C screening test    - Hepatitis C Screen Reflex to HCV RNA Quant and Genotype; Future  - Hepatitis C Screen Reflex to HCV RNA Quant and Genotype    Need for prophylactic vaccination and inoculation against influenza        Possible Sleep  Apnea: mild          Risks and Recommendations:  The patient has the following additional risks and recommendations for perioperative complications:   - No identified additional risk factors other than previously addressed    Medication Instructions:  Patient is on no chronic medications    RECOMMENDATION:  APPROVAL GIVEN to proceed with proposed procedure, without further diagnostic evaluation.                      Subjective     HPI related to upcoming procedure: deaf in left ear-has an implant that is not working.  3rd window syndrome-patch on left   Always congested and sleep apnea-mild      Preop Questions 9/26/2021   1. Have you ever had a heart attack or stroke? No   2. Have you ever had surgery on your heart or blood vessels, such as a stent placement, a coronary artery bypass, or surgery on an artery in your head, neck, heart, or legs? No   3. Do you have chest pain with activity? No   4. Do you have a history of  heart failure? No   5. Do you currently have a cold, bronchitis or symptoms of other infection? No   6. Do you have a cough, shortness of breath, or wheezing? No   7. Do you or anyone in your family have previous history of blood clots? No   8. Do you or does anyone in your family have a serious bleeding problem such as prolonged bleeding following surgeries or cuts? No   9. Have you ever had problems with anemia or been told to take iron pills? YES -    10. Have you had any abnormal blood loss such as black, tarry or bloody stools, or abnormal vaginal bleeding? No   11. Have you ever had a blood transfusion? No   12. Are you willing to have a blood transfusion if it is medically needed before, during, or after your surgery? Yes   13. Have you or any of your relatives ever had problems with anesthesia? No   14. Do you have sleep apnea, excessive snoring or daytime drowsiness? YES - mild sleep apnea    14a. Do you have a CPAP machine? No   15. Do you have any artifical heart valves or other  implanted medical devices like a pacemaker, defibrillator, or continuous glucose monitor? No   16. Do you have artificial joints? No   17. Are you allergic to latex? No   18. Is there any chance that you may be pregnant? No       Health Care Directive:  Patient does not have a Health Care Directive or Living Will: No     Preoperative Review of :   reviewed - no record of controlled substances prescribed.      Status of Chronic Conditions:  ASTHMA - Patient has a longstanding history of moderate-severe Asthma . Patient has been doing well overall noting NO SYMPTOMS and continues on medication regimen consisting of albuterol with exercise  without adverse reactions or side effects.       Review of Systems  CONSTITUTIONAL: NEGATIVE for fever, chills, change in weight  INTEGUMENTARY/SKIN: NEGATIVE for worrisome rashes, moles or lesions  EYES: NEGATIVE for vision changes or irritation  ENT/MOUTH: no new changes  RESP: NEGATIVE for significant cough or SOB  CV: NEGATIVE for chest pain, palpitations or peripheral edema  GI: NEGATIVE for nausea, abdominal pain, heartburn, or change in bowel habits  : NEGATIVE for frequency, dysuria, or hematuria  MUSCULOSKELETAL: NEGATIVE for significant arthralgias or myalgia  NEURO: no new headaches or dizziness   ENDOCRINE: NEGATIVE for temperature intolerance, skin/hair changes  HEME: NEGATIVE for bleeding problems  PSYCHIATRIC: NEGATIVE for changes in mood or affect    Patient Active Problem List    Diagnosis Date Noted     Mild intermittent asthma, unspecified whether complicated 07/12/2019     Priority: Medium     Iron deficiency anemia, unspecified iron deficiency anemia type 06/12/2018     Priority: Medium     Cervical high risk HPV (human papillomavirus) test positive 06/12/2018     Priority: Medium     6/12/18 NIL pap, + HR HPV (not 16 or 18) Plan: cotest in 1 yr.  7/12/19 Pap: NIL/neg HR HPV. Plan cotest in 3 years.       Anxiety 09/03/2015     Priority: Medium      "CARDIOVASCULAR SCREENING; LDL GOAL LESS THAN 160 2011     Priority: Medium     Eczema 2011     Priority: Medium      Past Medical History:   Diagnosis Date     Anxiety 9/3/2015     Cervical high risk HPV (human papillomavirus) test positive 2018     Iron deficiency anemia, unspecified iron deficiency anemia type 2018     Past Surgical History:   Procedure Laterality Date     GYN SURGERY           Current Outpatient Medications   Medication Sig Dispense Refill     Cetirizine HCl (ZYRTEC PO)        IRON PO        albuterol (PROAIR HFA/PROVENTIL HFA/VENTOLIN HFA) 108 (90 Base) MCG/ACT inhaler Inhale 2 puffs into the lungs every 6 hours (Patient not taking: Reported on 2021) 18 g 1       No Known Allergies     Social History     Tobacco Use     Smoking status: Never Smoker     Smokeless tobacco: Never Used   Substance Use Topics     Alcohol use: Yes     Comment: occasional     Family History   Problem Relation Age of Onset     Hypertension Father      Heart Disease Maternal Grandfather      Eye Disorder Paternal Grandmother      Thyroid Disease Mother      History   Drug Use No         Objective     /80   Pulse 65   Temp 97.9  F (36.6  C) (Oral)   Ht 1.676 m (5' 6\")   Wt 70.8 kg (156 lb)   BMI 25.18 kg/m      Physical Exam    GENERAL APPEARANCE: healthy, alert and no distress     HENT: ear canals and TM's normal and nose and mouth without ulcers or lesions     NECK: no adenopathy, no asymmetry, masses, or scars and thyroid normal to palpation     RESP: lungs clear to auscultation - no rales, rhonchi or wheezes     CV: regular rates and rhythm, normal S1 S2, no S3 or S4 and no murmur, click or rub     ABDOMEN:  soft, nontender, no HSM or masses and bowel sounds normal     MS: extremities normal- no gross deformities noted, no evidence of inflammation in joints, FROM in all extremities.     NEURO: Normal strength and tone, sensory exam grossly normal, mentation intact and " speech normal     PSYCH: mentation appears normal. and affect normal/bright     LYMPHATICS: No cervical adenopathy    No results for input(s): HGB, PLT, INR, NA, POTASSIUM, CR, A1C in the last 66769 hours.     Diagnostics:  Labs pending at this time.  Results will be reviewed when available.   No EKG required, no history of coronary heart disease, significant arrhythmia, peripheral arterial disease or other structural heart disease.    Revised Cardiac Risk Index (RCRI):  The patient has the following serious cardiovascular risks for perioperative complications:   - No serious cardiac risks = 0 points     RCRI Interpretation: 0 points: Class I (very low risk - 0.4% complication rate)           Signed Electronically by: Makenna Dyer PA-C  Copy of this evaluation report is provided to requesting physician.

## 2021-09-28 NOTE — PATIENT INSTRUCTIONS

## 2021-09-29 LAB
HCV AB SERPL QL IA: NONREACTIVE
HIV 1+2 AB+HIV1 P24 AG SERPL QL IA: NONREACTIVE

## 2021-09-29 ASSESSMENT — ASTHMA QUESTIONNAIRES: ACT_TOTALSCORE: 25

## 2021-11-29 ENCOUNTER — OFFICE VISIT (OUTPATIENT)
Dept: FAMILY MEDICINE | Facility: CLINIC | Age: 41
End: 2021-11-29
Payer: COMMERCIAL

## 2021-11-29 VITALS
WEIGHT: 158 LBS | BODY MASS INDEX: 25.39 KG/M2 | DIASTOLIC BLOOD PRESSURE: 73 MMHG | TEMPERATURE: 98.6 F | SYSTOLIC BLOOD PRESSURE: 120 MMHG | HEART RATE: 62 BPM | HEIGHT: 66 IN

## 2021-11-29 DIAGNOSIS — R61 NIGHT SWEATS: ICD-10-CM

## 2021-11-29 DIAGNOSIS — Z00.00 ROUTINE GENERAL MEDICAL EXAMINATION AT A HEALTH CARE FACILITY: Primary | ICD-10-CM

## 2021-11-29 DIAGNOSIS — E61.1 IRON DEFICIENCY: ICD-10-CM

## 2021-11-29 LAB
ALBUMIN SERPL-MCNC: 3.7 G/DL (ref 3.4–5)
ALP SERPL-CCNC: 38 U/L (ref 40–150)
ALT SERPL W P-5'-P-CCNC: 30 U/L (ref 0–50)
ANION GAP SERPL CALCULATED.3IONS-SCNC: 4 MMOL/L (ref 3–14)
AST SERPL W P-5'-P-CCNC: 24 U/L (ref 0–45)
BILIRUB SERPL-MCNC: 0.7 MG/DL (ref 0.2–1.3)
BUN SERPL-MCNC: 11 MG/DL (ref 7–30)
CALCIUM SERPL-MCNC: 9.2 MG/DL (ref 8.5–10.1)
CHLORIDE BLD-SCNC: 108 MMOL/L (ref 94–109)
CHOLEST SERPL-MCNC: 232 MG/DL
CO2 SERPL-SCNC: 27 MMOL/L (ref 20–32)
CREAT SERPL-MCNC: 0.79 MG/DL (ref 0.52–1.04)
ERYTHROCYTE [DISTWIDTH] IN BLOOD BY AUTOMATED COUNT: 12.9 % (ref 10–15)
FASTING STATUS PATIENT QL REPORTED: NO
FERRITIN SERPL-MCNC: 71 NG/ML (ref 12–150)
FSH SERPL-ACNC: 7.3 IU/L
GFR SERPL CREATININE-BSD FRML MDRD: >90 ML/MIN/1.73M2
GLUCOSE BLD-MCNC: 78 MG/DL (ref 70–99)
HCT VFR BLD AUTO: 39 % (ref 35–47)
HDLC SERPL-MCNC: 86 MG/DL
HGB BLD-MCNC: 12.9 G/DL (ref 11.7–15.7)
IRON SATN MFR SERPL: 23 % (ref 15–46)
IRON SERPL-MCNC: 63 UG/DL (ref 35–180)
LDLC SERPL CALC-MCNC: 123 MG/DL
LH SERPL-ACNC: 9.7 IU/L
MAGNESIUM SERPL-MCNC: 2.1 MG/DL (ref 1.6–2.3)
MCH RBC QN AUTO: 30.3 PG (ref 26.5–33)
MCHC RBC AUTO-ENTMCNC: 33.1 G/DL (ref 31.5–36.5)
MCV RBC AUTO: 92 FL (ref 78–100)
NONHDLC SERPL-MCNC: 146 MG/DL
PLATELET # BLD AUTO: 390 10E3/UL (ref 150–450)
POTASSIUM BLD-SCNC: 3.8 MMOL/L (ref 3.4–5.3)
PROT SERPL-MCNC: 6.8 G/DL (ref 6.8–8.8)
RBC # BLD AUTO: 4.26 10E6/UL (ref 3.8–5.2)
SODIUM SERPL-SCNC: 139 MMOL/L (ref 133–144)
T4 FREE SERPL-MCNC: 0.91 NG/DL (ref 0.76–1.46)
TIBC SERPL-MCNC: 272 UG/DL (ref 240–430)
TRIGL SERPL-MCNC: 113 MG/DL
TSH SERPL DL<=0.005 MIU/L-ACNC: 4.16 MU/L (ref 0.4–4)
WBC # BLD AUTO: 7.5 10E3/UL (ref 4–11)

## 2021-11-29 PROCEDURE — 85027 COMPLETE CBC AUTOMATED: CPT | Performed by: PHYSICIAN ASSISTANT

## 2021-11-29 PROCEDURE — 99396 PREV VISIT EST AGE 40-64: CPT | Performed by: PHYSICIAN ASSISTANT

## 2021-11-29 PROCEDURE — 83001 ASSAY OF GONADOTROPIN (FSH): CPT | Performed by: PHYSICIAN ASSISTANT

## 2021-11-29 PROCEDURE — 83735 ASSAY OF MAGNESIUM: CPT | Performed by: PHYSICIAN ASSISTANT

## 2021-11-29 PROCEDURE — 82728 ASSAY OF FERRITIN: CPT | Performed by: PHYSICIAN ASSISTANT

## 2021-11-29 PROCEDURE — 80061 LIPID PANEL: CPT | Performed by: PHYSICIAN ASSISTANT

## 2021-11-29 PROCEDURE — 80053 COMPREHEN METABOLIC PANEL: CPT | Performed by: PHYSICIAN ASSISTANT

## 2021-11-29 PROCEDURE — 83550 IRON BINDING TEST: CPT | Performed by: PHYSICIAN ASSISTANT

## 2021-11-29 PROCEDURE — 99213 OFFICE O/P EST LOW 20 MIN: CPT | Mod: 25 | Performed by: PHYSICIAN ASSISTANT

## 2021-11-29 PROCEDURE — 36415 COLL VENOUS BLD VENIPUNCTURE: CPT | Performed by: PHYSICIAN ASSISTANT

## 2021-11-29 PROCEDURE — 83002 ASSAY OF GONADOTROPIN (LH): CPT | Performed by: PHYSICIAN ASSISTANT

## 2021-11-29 PROCEDURE — 84439 ASSAY OF FREE THYROXINE: CPT | Performed by: PHYSICIAN ASSISTANT

## 2021-11-29 PROCEDURE — 84443 ASSAY THYROID STIM HORMONE: CPT | Performed by: PHYSICIAN ASSISTANT

## 2021-11-29 PROCEDURE — 82306 VITAMIN D 25 HYDROXY: CPT | Performed by: PHYSICIAN ASSISTANT

## 2021-11-29 ASSESSMENT — ENCOUNTER SYMPTOMS
WEAKNESS: 0
FREQUENCY: 0
JOINT SWELLING: 0
CONSTIPATION: 0
PALPITATIONS: 0
MYALGIAS: 0
EYE PAIN: 0
HEMATURIA: 0
DYSURIA: 0
HEARTBURN: 0
FEVER: 0
CHILLS: 0
HEADACHES: 1
SHORTNESS OF BREATH: 0
COUGH: 0
NERVOUS/ANXIOUS: 0
ARTHRALGIAS: 0
BREAST MASS: 0
DIARRHEA: 0
SORE THROAT: 0
HEMATOCHEZIA: 0
NAUSEA: 0
DIZZINESS: 0
PARESTHESIAS: 0
ABDOMINAL PAIN: 0

## 2021-11-29 ASSESSMENT — MIFFLIN-ST. JEOR: SCORE: 1388.43

## 2021-11-30 LAB — DEPRECATED CALCIDIOL+CALCIFEROL SERPL-MC: 37 UG/L (ref 20–75)

## 2022-01-10 ENCOUNTER — E-VISIT (OUTPATIENT)
Dept: FAMILY MEDICINE | Facility: CLINIC | Age: 42
End: 2022-01-10
Payer: COMMERCIAL

## 2022-01-10 DIAGNOSIS — Z20.822 ENCOUNTER FOR LABORATORY TESTING FOR COVID-19 VIRUS: Primary | ICD-10-CM

## 2022-01-10 PROCEDURE — 99207 PR NON-BILLABLE SERV PER CHARTING: CPT | Performed by: PHYSICIAN ASSISTANT

## 2022-01-14 ENCOUNTER — OFFICE VISIT (OUTPATIENT)
Dept: FAMILY MEDICINE | Facility: CLINIC | Age: 42
End: 2022-01-14
Payer: COMMERCIAL

## 2022-01-14 VITALS
WEIGHT: 158 LBS | TEMPERATURE: 97.6 F | HEART RATE: 64 BPM | BODY MASS INDEX: 25.39 KG/M2 | SYSTOLIC BLOOD PRESSURE: 125 MMHG | DIASTOLIC BLOOD PRESSURE: 76 MMHG | HEIGHT: 66 IN

## 2022-01-14 DIAGNOSIS — H83.8X2 AUTOIMMUNE DISORDER OF LEFT INNER EAR: ICD-10-CM

## 2022-01-14 DIAGNOSIS — Z01.818 PREOP GENERAL PHYSICAL EXAM: Primary | ICD-10-CM

## 2022-01-14 DIAGNOSIS — H90.12 CONDUCTIVE HEARING LOSS OF LEFT EAR, UNSPECIFIED HEARING STATUS ON CONTRALATERAL SIDE: ICD-10-CM

## 2022-01-14 PROCEDURE — U0005 INFEC AGEN DETEC AMPLI PROBE: HCPCS | Performed by: PHYSICIAN ASSISTANT

## 2022-01-14 PROCEDURE — 99214 OFFICE O/P EST MOD 30 MIN: CPT | Performed by: PHYSICIAN ASSISTANT

## 2022-01-14 PROCEDURE — U0003 INFECTIOUS AGENT DETECTION BY NUCLEIC ACID (DNA OR RNA); SEVERE ACUTE RESPIRATORY SYNDROME CORONAVIRUS 2 (SARS-COV-2) (CORONAVIRUS DISEASE [COVID-19]), AMPLIFIED PROBE TECHNIQUE, MAKING USE OF HIGH THROUGHPUT TECHNOLOGIES AS DESCRIBED BY CMS-2020-01-R: HCPCS | Performed by: PHYSICIAN ASSISTANT

## 2022-01-14 ASSESSMENT — MIFFLIN-ST. JEOR: SCORE: 1388.43

## 2022-01-14 NOTE — PROGRESS NOTES
[facility]  [address] [place] [address]  [place] [address]  Phone: [phone]  Primary Provider: Manjit Dyer  Pre-op Performing Provider: MANJIT DYER      PREOPERATIVE EVALUATION:  Today's date: 1/14/2022    Xin Marquez is a 41 year old female who presents for a preoperative evaluation.    Surgical Information:  Surgery/Procedure: L ear -placing prostheses   Surgery Location: [facility]   Surgeon: Dr. Santa   Surgery Date: 1-20-22   Time of Surgery: 8:00 am   Where patient plans to recover: At home with family  Fax number for surgical facility: [phone]     Type of Anesthesia Anticipated: to be determined, mild sedation         Assessment & Plan     The proposed surgical procedure is considered LOW risk.    Preop general physical exam  Follow up as needed  - Asymptomatic COVID-19 Virus (Coronavirus) by PCR Nose    Conductive hearing loss of left ear, unspecified hearing status on contralateral side    - Asymptomatic COVID-19 Virus (Coronavirus) by PCR Nose    Autoimmune disorder of left inner ear    - Asymptomatic COVID-19 Virus (Coronavirus) by PCR Nose         Risks and Recommendations:  The patient has the following additional risks and recommendations for perioperative complications:   - No identified additional risk factors other than previously addressed    Medication Instructions:  Patient is to take all scheduled medications on the day of surgery    RECOMMENDATION:  APPROVAL GIVEN to proceed with proposed procedure, without further diagnostic evaluation.                      Subjective     HPI related to upcoming procedure: having a prostheses in ear placed     Preop Questions 1/10/2022   1. Have you ever had a heart attack or stroke? No   2. Have you ever had surgery on your heart or blood vessels, such as a stent placement, a coronary artery bypass, or surgery on an artery in your head, neck, heart, or legs? No   3. Do you  have chest pain with activity? No   4. Do you have a history of  heart failure? No   5. Do you currently have a cold, bronchitis or symptoms of other infection? No   6. Do you have a cough, shortness of breath, or wheezing? No   7. Do you or anyone in your family have previous history of blood clots? No   8. Do you or does anyone in your family have a serious bleeding problem such as prolonged bleeding following surgeries or cuts? No   9. Have you ever had problems with anemia or been told to take iron pills? YES - takes iron    10. Have you had any abnormal blood loss such as black, tarry or bloody stools, or abnormal vaginal bleeding? No   11. Have you ever had a blood transfusion? No   12. Are you willing to have a blood transfusion if it is medically needed before, during, or after your surgery? Yes   13. Have you or any of your relatives ever had problems with anesthesia? No   14. Do you have sleep apnea, excessive snoring or daytime drowsiness? No   14a. Do you have a CPAP machine? -   15. Do you have any artifical heart valves or other implanted medical devices like a pacemaker, defibrillator, or continuous glucose monitor? No   16. Do you have artificial joints? No   17. Are you allergic to latex? No   18. Is there any chance that you may be pregnant? No       Health Care Directive:  Patient does not have a Health Care Directive or Living Will: no     Preoperative Review of :   reviewed - controlled substances prescribed by other outside provider(s).          Review of Systems  CONSTITUTIONAL: NEGATIVE for fever, chills, change in weight  INTEGUMENTARY/SKIN: NEGATIVE for worrisome rashes, moles or lesions  EYES: NEGATIVE for vision changes or irritation  ENT/MOUTH: hearing loss in left ear, left ear feels full   RESP: NEGATIVE for significant cough or SOB  CV: NEGATIVE for chest pain, palpitations or peripheral edema  MUSCULOSKELETAL: NEGATIVE for significant arthralgias or myalgia  HEME: NEGATIVE for  bleeding problems  PSYCHIATRIC: NEGATIVE for changes in mood or affect    Patient Active Problem List    Diagnosis Date Noted     Deviated nasal septum 2021     Priority: Medium     Conductive hearing loss of left ear, unspecified hearing status on contralateral side 2021     Priority: Medium     Autoimmune disorder of left inner ear 2021     Priority: Medium     Iron deficiency 2021     Priority: Medium     Mild intermittent asthma, unspecified whether complicated 2019     Priority: Medium     Cervical high risk HPV (human papillomavirus) test positive 2018     Priority: Medium     18 NIL pap, + HR HPV (not 16 or 18) Plan: cotest in 1 yr.  19 Pap: NIL/neg HR HPV. Plan cotest in 3 years.       Anxiety 2015     Priority: Medium     CARDIOVASCULAR SCREENING; LDL GOAL LESS THAN 160 2011     Priority: Medium     Eczema 2011     Priority: Medium      Past Medical History:   Diagnosis Date     Anxiety 9/3/2015     Cervical high risk HPV (human papillomavirus) test positive 2018     Iron deficiency anemia, unspecified iron deficiency anemia type 2018     Past Surgical History:   Procedure Laterality Date     GYN SURGERY           Current Outpatient Medications   Medication Sig Dispense Refill     ALPRAZolam (XANAX) 0.25 MG tablet Take 1 tablet (0.25 mg) by mouth 3 times daily as needed for anxiety For travel and PRN 20 tablet 0     IRON PO        albuterol (PROAIR HFA/PROVENTIL HFA/VENTOLIN HFA) 108 (90 Base) MCG/ACT inhaler Inhale 2 puffs into the lungs every 6 hours (Patient not taking: Reported on 2021) 18 g 1     Cetirizine HCl (ZYRTEC PO)  (Patient not taking: Reported on 2021)         No Known Allergies     Social History     Tobacco Use     Smoking status: Never Smoker     Smokeless tobacco: Never Used   Substance Use Topics     Alcohol use: Yes     Comment: occasional     Family History   Problem Relation Age of Onset      "Hypertension Father      Heart Disease Maternal Grandfather      Eye Disorder Paternal Grandmother      Thyroid Disease Mother      History   Drug Use No         Objective     /76   Pulse 64   Temp 97.6  F (36.4  C) (Oral)   Ht 1.676 m (5' 6\")   Wt 71.7 kg (158 lb)   BMI 25.50 kg/m      Physical Exam    GENERAL APPEARANCE: healthy, alert and no distress     HENT: ear canals and TM's normal and nose and mouth without ulcers or lesions     NECK: no adenopathy, no asymmetry, masses, or scars and thyroid normal to palpation     RESP: lungs clear to auscultation - no rales, rhonchi or wheezes     CV: regular rates and rhythm, normal S1 S2, no S3 or S4 and no murmur, click or rub     ABDOMEN:  soft, nontender, no HSM or masses and bowel sounds normal     MS: extremities normal- no gross deformities noted, no evidence of inflammation in joints, FROM in all extremities.     NEURO: Normal strength and tone, sensory exam grossly normal, mentation intact and speech normal     PSYCH: mentation appears normal. and affect normal/bright     LYMPHATICS: No cervical adenopathy    Recent Labs   Lab Test 11/29/21  1549 09/28/21  1335   HGB 12.9 13.6    320     --    POTASSIUM 3.8  --    CR 0.79  --         Diagnostics:  No labs were ordered during this visit.   No EKG required for low risk surgery (cataract, skin procedure, breast biopsy, etc).    Revised Cardiac Risk Index (RCRI):  The patient has the following serious cardiovascular risks for perioperative complications:   - No serious cardiac risks = 0 points     RCRI Interpretation: 0 points: Class I (very low risk - 0.4% complication rate)           Signed Electronically by: Makenna Dyer PA-C  Copy of this evaluation report is provided to requesting physician.      "

## 2022-01-15 LAB — SARS-COV-2 RNA RESP QL NAA+PROBE: NEGATIVE

## 2022-01-29 ENCOUNTER — E-VISIT (OUTPATIENT)
Dept: FAMILY MEDICINE | Facility: CLINIC | Age: 42
End: 2022-01-29
Payer: COMMERCIAL

## 2022-01-29 DIAGNOSIS — R05.9 COUGH: Primary | ICD-10-CM

## 2022-01-29 DIAGNOSIS — J45.20 MILD INTERMITTENT ASTHMA, UNSPECIFIED WHETHER COMPLICATED: ICD-10-CM

## 2022-01-29 PROCEDURE — 99421 OL DIG E/M SVC 5-10 MIN: CPT | Performed by: PHYSICIAN ASSISTANT

## 2022-01-31 RX ORDER — ALBUTEROL SULFATE 90 UG/1
2 AEROSOL, METERED RESPIRATORY (INHALATION) EVERY 6 HOURS
Qty: 18 G | Refills: 1 | Status: SHIPPED | OUTPATIENT
Start: 2022-01-31 | End: 2022-01-31

## 2022-01-31 RX ORDER — BENZONATATE 200 MG/1
200 CAPSULE ORAL 3 TIMES DAILY PRN
Qty: 30 CAPSULE | Refills: 0 | Status: SHIPPED | OUTPATIENT
Start: 2022-01-31 | End: 2023-07-24

## 2022-01-31 RX ORDER — BENZONATATE 200 MG/1
200 CAPSULE ORAL 3 TIMES DAILY PRN
Qty: 30 CAPSULE | Refills: 0 | Status: SHIPPED | OUTPATIENT
Start: 2022-01-31 | End: 2022-01-31

## 2022-01-31 RX ORDER — ALBUTEROL SULFATE 90 UG/1
2 AEROSOL, METERED RESPIRATORY (INHALATION) EVERY 6 HOURS
Qty: 18 G | Refills: 1 | Status: SHIPPED | OUTPATIENT
Start: 2022-01-31

## 2022-06-08 ENCOUNTER — TELEPHONE (OUTPATIENT)
Dept: FAMILY MEDICINE | Facility: CLINIC | Age: 42
End: 2022-06-08
Payer: COMMERCIAL

## 2022-06-08 DIAGNOSIS — E61.1 IRON DEFICIENCY: Primary | ICD-10-CM

## 2022-10-05 ENCOUNTER — LAB (OUTPATIENT)
Dept: LAB | Facility: CLINIC | Age: 42
End: 2022-10-05
Payer: COMMERCIAL

## 2022-10-05 DIAGNOSIS — E61.1 IRON DEFICIENCY: ICD-10-CM

## 2022-10-05 PROCEDURE — 82728 ASSAY OF FERRITIN: CPT

## 2022-10-05 PROCEDURE — 36415 COLL VENOUS BLD VENIPUNCTURE: CPT

## 2022-10-06 LAB — FERRITIN SERPL-MCNC: 57 NG/ML (ref 12–150)

## 2022-11-06 ENCOUNTER — E-VISIT (OUTPATIENT)
Dept: FAMILY MEDICINE | Facility: CLINIC | Age: 42
End: 2022-11-06
Payer: COMMERCIAL

## 2022-11-06 DIAGNOSIS — J45.20 MILD INTERMITTENT ASTHMA, UNSPECIFIED WHETHER COMPLICATED: ICD-10-CM

## 2022-11-06 DIAGNOSIS — T75.3XXA MOTION SICKNESS, INITIAL ENCOUNTER: Primary | ICD-10-CM

## 2022-11-06 PROCEDURE — 99421 OL DIG E/M SVC 5-10 MIN: CPT | Performed by: PHYSICIAN ASSISTANT

## 2022-11-07 RX ORDER — PREDNISONE 20 MG/1
20 TABLET ORAL DAILY
Qty: 15 TABLET | Refills: 1 | Status: SHIPPED | OUTPATIENT
Start: 2022-11-07 | End: 2022-11-12

## 2022-11-07 RX ORDER — ONDANSETRON 4 MG/1
4 TABLET, ORALLY DISINTEGRATING ORAL EVERY 8 HOURS PRN
Qty: 30 TABLET | Refills: 1 | Status: SHIPPED | OUTPATIENT
Start: 2022-11-07

## 2022-11-09 ASSESSMENT — ASTHMA QUESTIONNAIRES: ACT_TOTALSCORE: 25

## 2022-11-09 NOTE — TELEPHONE ENCOUNTER
Please enter ACT based on her results.  All 5 and no ER or hospital visits.  See her message.    Makenna Dyer PA-C

## 2023-01-14 ENCOUNTER — HEALTH MAINTENANCE LETTER (OUTPATIENT)
Age: 43
End: 2023-01-14

## 2023-07-23 ASSESSMENT — ENCOUNTER SYMPTOMS
ABDOMINAL PAIN: 0
PALPITATIONS: 0
NERVOUS/ANXIOUS: 0
COUGH: 0
HEMATOCHEZIA: 0
SORE THROAT: 0
DIZZINESS: 0
PARESTHESIAS: 0
HEMATURIA: 0
JOINT SWELLING: 0
FREQUENCY: 0
WEAKNESS: 0
NAUSEA: 0
BREAST MASS: 0
DIARRHEA: 0
HEARTBURN: 0
CONSTIPATION: 0
HEADACHES: 0
ARTHRALGIAS: 0
MYALGIAS: 0
EYE PAIN: 0
DYSURIA: 0
FEVER: 0
CHILLS: 0
SHORTNESS OF BREATH: 0

## 2023-07-23 ASSESSMENT — ASTHMA QUESTIONNAIRES: ACT_TOTALSCORE: 22

## 2023-07-24 ENCOUNTER — OFFICE VISIT (OUTPATIENT)
Dept: FAMILY MEDICINE | Facility: CLINIC | Age: 43
End: 2023-07-24
Payer: COMMERCIAL

## 2023-07-24 ENCOUNTER — LAB (OUTPATIENT)
Dept: FAMILY MEDICINE | Facility: CLINIC | Age: 43
End: 2023-07-24

## 2023-07-24 VITALS
RESPIRATION RATE: 18 BRPM | BODY MASS INDEX: 24.75 KG/M2 | HEART RATE: 60 BPM | OXYGEN SATURATION: 98 % | SYSTOLIC BLOOD PRESSURE: 107 MMHG | DIASTOLIC BLOOD PRESSURE: 74 MMHG | TEMPERATURE: 97.8 F | WEIGHT: 154 LBS | HEIGHT: 66 IN

## 2023-07-24 DIAGNOSIS — F41.9 ANXIETY: ICD-10-CM

## 2023-07-24 DIAGNOSIS — R87.810 CERVICAL HIGH RISK HPV (HUMAN PAPILLOMAVIRUS) TEST POSITIVE: ICD-10-CM

## 2023-07-24 DIAGNOSIS — E61.1 IRON DEFICIENCY: ICD-10-CM

## 2023-07-24 DIAGNOSIS — Z00.00 ROUTINE HISTORY AND PHYSICAL EXAMINATION OF ADULT: Primary | ICD-10-CM

## 2023-07-24 DIAGNOSIS — R79.89 ABNORMAL THYROID SCREEN (BLOOD): ICD-10-CM

## 2023-07-24 DIAGNOSIS — Z00.00 ROUTINE HISTORY AND PHYSICAL EXAMINATION OF ADULT: ICD-10-CM

## 2023-07-24 LAB
ALBUMIN SERPL BCG-MCNC: 4.6 G/DL (ref 3.5–5.2)
ALP SERPL-CCNC: 40 U/L (ref 35–104)
ALT SERPL W P-5'-P-CCNC: 16 U/L (ref 0–50)
ANION GAP SERPL CALCULATED.3IONS-SCNC: 11 MMOL/L (ref 7–15)
AST SERPL W P-5'-P-CCNC: 23 U/L (ref 0–45)
BILIRUB SERPL-MCNC: 1.2 MG/DL
BUN SERPL-MCNC: 11.8 MG/DL (ref 6–20)
CALCIUM SERPL-MCNC: 9.3 MG/DL (ref 8.6–10)
CHLORIDE SERPL-SCNC: 107 MMOL/L (ref 98–107)
CHOLEST SERPL-MCNC: 213 MG/DL
CREAT SERPL-MCNC: 0.88 MG/DL (ref 0.51–0.95)
DEPRECATED HCO3 PLAS-SCNC: 24 MMOL/L (ref 22–29)
FERRITIN SERPL-MCNC: 94 NG/ML (ref 6–175)
GFR SERPL CREATININE-BSD FRML MDRD: 83 ML/MIN/1.73M2
GLUCOSE SERPL-MCNC: 82 MG/DL (ref 70–99)
HDLC SERPL-MCNC: 80 MG/DL
LDLC SERPL CALC-MCNC: 121 MG/DL
NONHDLC SERPL-MCNC: 133 MG/DL
POTASSIUM SERPL-SCNC: 4 MMOL/L (ref 3.4–5.3)
PROT SERPL-MCNC: 7 G/DL (ref 6.4–8.3)
SODIUM SERPL-SCNC: 142 MMOL/L (ref 136–145)
TRIGL SERPL-MCNC: 59 MG/DL
TSH SERPL DL<=0.005 MIU/L-ACNC: 2.91 UIU/ML (ref 0.3–4.2)

## 2023-07-24 PROCEDURE — 36415 COLL VENOUS BLD VENIPUNCTURE: CPT | Performed by: PHYSICIAN ASSISTANT

## 2023-07-24 PROCEDURE — 82728 ASSAY OF FERRITIN: CPT | Performed by: PHYSICIAN ASSISTANT

## 2023-07-24 PROCEDURE — G0145 SCR C/V CYTO,THINLAYER,RESCR: HCPCS | Performed by: PHYSICIAN ASSISTANT

## 2023-07-24 PROCEDURE — 84443 ASSAY THYROID STIM HORMONE: CPT | Performed by: PHYSICIAN ASSISTANT

## 2023-07-24 PROCEDURE — 82172 ASSAY OF APOLIPOPROTEIN: CPT | Mod: 90 | Performed by: PHYSICIAN ASSISTANT

## 2023-07-24 PROCEDURE — 99213 OFFICE O/P EST LOW 20 MIN: CPT | Mod: 25 | Performed by: PHYSICIAN ASSISTANT

## 2023-07-24 PROCEDURE — 80061 LIPID PANEL: CPT | Performed by: PHYSICIAN ASSISTANT

## 2023-07-24 PROCEDURE — 87624 HPV HI-RISK TYP POOLED RSLT: CPT | Performed by: PHYSICIAN ASSISTANT

## 2023-07-24 PROCEDURE — 80053 COMPREHEN METABOLIC PANEL: CPT | Performed by: PHYSICIAN ASSISTANT

## 2023-07-24 PROCEDURE — 99396 PREV VISIT EST AGE 40-64: CPT | Performed by: PHYSICIAN ASSISTANT

## 2023-07-24 PROCEDURE — 99000 SPECIMEN HANDLING OFFICE-LAB: CPT | Performed by: PHYSICIAN ASSISTANT

## 2023-07-24 RX ORDER — ALPRAZOLAM 0.25 MG
0.25 TABLET ORAL 3 TIMES DAILY PRN
Qty: 20 TABLET | Refills: 0 | Status: SHIPPED | OUTPATIENT
Start: 2023-07-24

## 2023-07-24 RX ORDER — FEXOFENADINE HCL 180 MG/1
180 TABLET ORAL DAILY
COMMUNITY

## 2023-07-24 ASSESSMENT — ENCOUNTER SYMPTOMS
JOINT SWELLING: 0
SHORTNESS OF BREATH: 0
PARESTHESIAS: 0
CHILLS: 0
NERVOUS/ANXIOUS: 0
HEMATOCHEZIA: 0
DIARRHEA: 0
SORE THROAT: 0
MYALGIAS: 0
FEVER: 0
ARTHRALGIAS: 0
CONSTIPATION: 0
COUGH: 0
PALPITATIONS: 0
FREQUENCY: 0
DYSURIA: 0
WEAKNESS: 0
EYE PAIN: 0
HEADACHES: 0
HEMATURIA: 0
ABDOMINAL PAIN: 0
HEARTBURN: 0
NAUSEA: 0
DIZZINESS: 0
BREAST MASS: 0

## 2023-07-24 NOTE — PROGRESS NOTES
SUBJECTIVE:   CC: Xin is an 42 year old who presents for preventive health visit.        No data to display              Healthy Habits:     Getting at least 3 servings of Calcium per day:  Yes    Bi-annual eye exam:  Yes    Dental care twice a year:  Yes    Sleep apnea or symptoms of sleep apnea:  None    Diet:  Other    Frequency of exercise:  6-7 days/week    Duration of exercise:  30-45 minutes    Taking medications regularly:  Yes    Medication side effects:  None    Additional concerns today:  Yes      Today's PHQ-2 Score:       7/23/2023     5:34 PM   PHQ-2 ( 1999 [employer])   Q1: Little interest or pleasure in doing things 0   Q2: Feeling down, depressed or hopeless 0   PHQ-2 Score 0   Q1: Little interest or pleasure in doing things Not at all   Q2: Feeling down, depressed or hopeless Not at all   PHQ-2 Score 0           {Add if <65 person on Medicare  - Required Questions (Optional):600522}  {Outside tests to abstract? :181997}    {additional problems to add (Optional):364219}      Social History     Tobacco Use    Smoking status: Never    Smokeless tobacco: Never   Substance Use Topics    Alcohol use: Yes     Comment: occasional     {Rooming staff  Click this link to complete the Prescreen if response below is not for today's visit  Alcohol Use Prescreen >3 drinks/day or > 7 drinks/week.  If the prescreen question answer is YES, complete the full AUDIT  :718961}        7/23/2023     5:34 PM   Alcohol Use   Prescreen: >3 drinks/day or >7 drinks/week? No   {add AUDIT responses (Optional) (A score of 7 for adult men is an indication of hazardous drinking; a score of 8 or more is an indication of an alcohol use disorder.  A score of 7 or more for adult women is an indication of hazardous drinking or an alchohol use disorder):683795}  Reviewed orders with patient.  Reviewed health maintenance and updated orders accordingly - { :805563}  {Chronicprobdata (optional):435534}    Breast Cancer Screening:         11/29/2021     2:22 PM   Breast CA Risk Assessment (FHS-7)   Do you have a family history of breast, colon, or ovarian cancer? No / Unknown       {If any of the questions to the BCRA (FHS-7) are answered yes, consider ordering referral for genetic counseling (Optional) :441423}  {AMB Mammogram Decision Support (Optional) :394858}  Pertinent mammograms are reviewed under the imaging tab.    History of abnormal Pap smear: { :322246}      Latest Ref Rng & Units 7/12/2019     9:10 AM 6/12/2018     4:16 PM 6/12/2018     4:15 PM   PAP / HPV   PAP (Historical)  NIL  NIL     HPV 16 DNA NEG^Negative Negative   Negative    HPV 18 DNA NEG^Negative Negative   Negative    Other HR HPV NEG^Negative Negative   Positive      Reviewed and updated as needed this visit by clinical staff                  Reviewed and updated as needed this visit by Provider                 {HISTORY OPTIONS (Optional):450107}    Review of Systems   Constitutional:  Negative for chills and fever.   HENT:  Negative for congestion, ear pain, hearing loss and sore throat.    Eyes:  Negative for pain and visual disturbance.   Respiratory:  Negative for cough and shortness of breath.    Cardiovascular:  Negative for chest pain, palpitations and peripheral edema.   Gastrointestinal:  Negative for abdominal pain, constipation, diarrhea, heartburn, hematochezia and nausea.   Breasts:  Negative for tenderness, breast mass and discharge.   Genitourinary:  Negative for dysuria, frequency, genital sores, hematuria, pelvic pain, urgency, vaginal bleeding and vaginal discharge.   Musculoskeletal:  Negative for arthralgias, joint swelling and myalgias.   Skin:  Negative for rash.   Neurological:  Negative for dizziness, weakness, headaches and paresthesias.   Psychiatric/Behavioral:  Negative for mood changes. The patient is not nervous/anxious.      {FEMALE ROS (Optional):244660}     OBJECTIVE:   There were no vitals taken for this visit.  Physical Exam  {Exam  "Choices (Optional):113285}    {Diagnostic Test Results (Optional):321909}    ASSESSMENT/PLAN:   {Diag Picklist:982359}    {Patient advised of split billing (Optional):424990}      COUNSELING:  {FEMALE COUNSELING MESSAGES:171248::\"Reviewed preventive health counseling, as reflected in patient instructions\"}        She reports that she has never smoked. She has never used smokeless tobacco.      {Counseling Resources  [place] Preventive Services Task Force  Cholesterol Screening  Health diet/nutrition  Pooled Cohorts Equation Calculator  [employer] MyPlate  ASA Prophylaxis  Lung CA Screening  Osteoporosis prevention/bone health :712620}  {Breast Cancer Risk Calculator  BRCA-Related Cancer Risk Assessment FHS-7 Tool :388556}  NIGEL Muñiz [facility]  "

## 2023-07-24 NOTE — PROGRESS NOTES
SUBJECTIVE:   CC: Xin is an 42 year old who presents for preventive health visit.       2023     8:57 AM   Additional Questions   Roomed by Carrie   Accompanied by self         2023     8:57 AM   Patient Reported Additional Medications   Patient reports taking the following new medications allegra as needed     Healthy Habits:     Getting at least 3 servings of Calcium per day:  Yes    Bi-annual eye exam:  Yes    Dental care twice a year:  Yes    Sleep apnea or symptoms of sleep apnea:  None    Diet:  Other    Frequency of exercise:  6-7 days/week    Duration of exercise:  30-45 minutes    Taking medications regularly:  Yes    Medication side effects:  None    Additional concerns today:  Yes      Today's PHQ-2 Score:       2023     5:34 PM   PHQ-2 (  [employer])   Q1: Little interest or pleasure in doing things 0   Q2: Feeling down, depressed or hopeless 0   PHQ-2 Score 0   Q1: Little interest or pleasure in doing things Not at all   Q2: Feeling down, depressed or hopeless Not at all   PHQ-2 Score 0           Mammogram,colonoscopy questions,lab work -fasting     Sleep is off, having hot flashes.  Not always related to stress or anxiety.  Periods are different-more regular now and heavier.      Mom had early dx of osteoporosis.  She is wondering about testing        Social History     Tobacco Use    Smoking status: Never    Smokeless tobacco: Never   Substance Use Topics    Alcohol use: Yes     Comment: occasional             2023     5:34 PM   Alcohol Use   Prescreen: >3 drinks/day or >7 drinks/week? No          No data to display              Reviewed orders with patient.  Reviewed health maintenance and updated orders accordingly - Yes  Labs reviewed in EPIC    Breast Cancer Screenin/29/2021     2:22 PM   Breast CA Risk Assessment (FHS-7)   Do you have a family history of breast, colon, or ovarian cancer? No / Unknown       click delete button to remove this line  "now  Mammogram Screening: Recommended annual mammography  Pertinent mammograms are reviewed under the imaging tab.    History of abnormal Pap smear: YES - updated in Problem List and Health Maintenance accordingly      Latest Ref Rng & Units 7/12/2019     9:10 AM 6/12/2018     4:16 PM 6/12/2018     4:15 PM   PAP / HPV   PAP (Historical)  NIL  NIL     HPV 16 DNA NEG^Negative Negative   Negative    HPV 18 DNA NEG^Negative Negative   Negative    Other HR HPV NEG^Negative Negative   Positive      Reviewed and updated as needed this visit by clinical staff   Tobacco  Allergies  Meds              Reviewed and updated as needed this visit by Provider    Allergies  Meds                 Review of Systems   Constitutional:  Negative for chills and fever.   HENT:  Negative for congestion, ear pain, hearing loss and sore throat.    Eyes:  Negative for pain and visual disturbance.   Respiratory:  Negative for cough and shortness of breath.    Cardiovascular:  Negative for chest pain, palpitations and peripheral edema.   Gastrointestinal:  Negative for abdominal pain, constipation, diarrhea, heartburn, hematochezia and nausea.   Breasts:  Negative for tenderness, breast mass and discharge.   Genitourinary:  Negative for dysuria, frequency, genital sores, hematuria, pelvic pain, urgency, vaginal bleeding and vaginal discharge.   Musculoskeletal:  Negative for arthralgias, joint swelling and myalgias.   Skin:  Negative for rash.   Neurological:  Negative for dizziness, weakness, headaches and paresthesias.   Psychiatric/Behavioral:  Negative for mood changes. The patient is not nervous/anxious.           OBJECTIVE:   /74   Pulse 60   Temp 97.8  F (36.6  C) (Oral)   Resp 18   Ht 1.676 m (5' 6\")   Wt 69.9 kg (154 lb)   LMP 07/17/2023 (Exact Date)   SpO2 98%   BMI 24.86 kg/m    Physical Exam  Constitutional:       General: She is not in acute distress.     Appearance: She is well-developed.   HENT:      Right Ear: " Tympanic membrane, ear canal and external ear normal.      Left Ear: Ear canal and external ear normal.      Ears:      Comments: Left TM has a prosthesis in place      Mouth/Throat:      Pharynx: No oropharyngeal exudate.   Eyes:      Conjunctiva/sclera: Conjunctivae normal.   Neck:      Thyroid: No thyromegaly.   Cardiovascular:      Rate and Rhythm: Normal rate and regular rhythm.      Heart sounds: Normal heart sounds.   Pulmonary:      Effort: Pulmonary effort is normal.      Breath sounds: Normal breath sounds.   Chest:   Breasts:     Right: Normal.      Left: Normal.   Abdominal:      General: Bowel sounds are normal.      Palpations: Abdomen is soft.      Tenderness: There is no abdominal tenderness.   Genitourinary:     Vagina: Normal.      Cervix: Normal.   Lymphadenopathy:      Cervical: No cervical adenopathy.   Skin:     General: Skin is warm and dry.      Findings: No rash.   Neurological:      Mental Status: She is alert and oriented to person, place, and time.   Psychiatric:         Behavior: Behavior normal.               ASSESSMENT/PLAN:   (Z00.00) Routine history and physical examination of adult  (primary encounter diagnosis)  Comment:   Plan: Pap Screen with HPV - recommended age 30 - 65         years, Lipid panel reflex to direct LDL         Fasting, Apolipoprotein B, Comprehensive         metabolic panel (BMP + Alb, Alk Phos, ALT, AST,        Total. Bili, TP), TSH with free T4 reflex, *MA         Screening Digital Bilateral, COLOGUARD(EXACT         SCIENCES)        Overall healthy.  Labs ordered.      (E61.1) Iron deficiency  Comment: chronic   Plan: Ferritin        Recheck labs today.  Consider iron infusions is low.  Oral iron causes significant constipation     (R87.810) Cervical high risk HPV (human papillomavirus) test positive  Comment:   Plan: follow up as needed    (R79.89) Abnormal thyroid screen (blood)  Comment:   Plan: family hx of thyroid disorder     (F41.9) Anxiety  Comment:  stable  Plan: ALPRAZolam (XANAX) 0.25 MG tablet        Rarely uses.  refilled          COUNSELING:  Reviewed preventive health counseling, as reflected in patient instructions        She reports that she has never smoked. She has never used smokeless tobacco.          Makenna Dyer PA-C  [employer]

## 2023-07-25 LAB — APO B100 SERPL-MCNC: 93 MG/DL

## 2023-07-27 LAB
BKR LAB AP GYN ADEQUACY: NORMAL
BKR LAB AP GYN INTERPRETATION: NORMAL
BKR LAB AP HPV REFLEX: NORMAL
BKR LAB AP PREVIOUS ABNL DX: NORMAL
BKR LAB AP PREVIOUS ABNORMAL: NORMAL
PATH REPORT.COMMENTS IMP SPEC: NORMAL
PATH REPORT.COMMENTS IMP SPEC: NORMAL
PATH REPORT.RELEVANT HX SPEC: NORMAL

## 2023-07-31 LAB
HUMAN PAPILLOMA VIRUS 16 DNA: NEGATIVE
HUMAN PAPILLOMA VIRUS 18 DNA: NEGATIVE
HUMAN PAPILLOMA VIRUS FINAL DIAGNOSIS: NORMAL
HUMAN PAPILLOMA VIRUS OTHER HR: NEGATIVE

## 2023-08-01 ENCOUNTER — ANCILLARY PROCEDURE (OUTPATIENT)
Dept: MAMMOGRAPHY | Facility: CLINIC | Age: 43
End: 2023-08-01
Attending: PHYSICIAN ASSISTANT
Payer: COMMERCIAL

## 2023-08-01 DIAGNOSIS — Z00.00 ROUTINE HISTORY AND PHYSICAL EXAMINATION OF ADULT: ICD-10-CM

## 2023-08-01 PROCEDURE — 77067 SCR MAMMO BI INCL CAD: CPT | Mod: GC | Performed by: RADIOLOGY

## 2023-08-01 PROCEDURE — 77063 BREAST TOMOSYNTHESIS BI: CPT | Mod: GC | Performed by: RADIOLOGY

## 2023-08-25 LAB — NONINV COLON CA DNA+OCC BLD SCRN STL QL: NEGATIVE

## 2023-08-26 NOTE — RESULT ENCOUNTER NOTE
Xin,    Your Cologuard test results are normal. This means you are at low risk for colon cancer. This routine screening test should be completed every 3 years until age 75.     Dilcia Ritchie MD

## 2024-07-31 ENCOUNTER — TELEPHONE (OUTPATIENT)
Dept: FAMILY MEDICINE | Facility: CLINIC | Age: 44
End: 2024-07-31
Payer: COMMERCIAL

## 2024-07-31 NOTE — TELEPHONE ENCOUNTER
Patient Quality Outreach    Patient is due for the following:   Physical Preventive Adult Physical    Next Steps:   Schedule a Adult Preventative    Type of outreach:    Sent [employer] message.    Next Steps:  Reach out within 90 days via [employer].    Max number of attempts reached: Yes. Will try again in 90 days if patient still on fail list.    Questions for provider review:    None           Shelley Snell CMA

## 2024-09-08 ENCOUNTER — HEALTH MAINTENANCE LETTER (OUTPATIENT)
Age: 44
End: 2024-09-08

## 2024-10-16 ENCOUNTER — OFFICE VISIT (OUTPATIENT)
Dept: FAMILY MEDICINE | Facility: CLINIC | Age: 44
End: 2024-10-16
Attending: PHYSICIAN ASSISTANT
Payer: COMMERCIAL

## 2024-10-16 VITALS
BODY MASS INDEX: 26.2 KG/M2 | WEIGHT: 163 LBS | RESPIRATION RATE: 17 BRPM | SYSTOLIC BLOOD PRESSURE: 123 MMHG | OXYGEN SATURATION: 100 % | HEART RATE: 56 BPM | DIASTOLIC BLOOD PRESSURE: 80 MMHG | HEIGHT: 66 IN | TEMPERATURE: 97.7 F

## 2024-10-16 DIAGNOSIS — R21 RASH: ICD-10-CM

## 2024-10-16 DIAGNOSIS — Z82.62 FAMILY HISTORY OF OSTEOPOROSIS: ICD-10-CM

## 2024-10-16 DIAGNOSIS — F41.9 ANXIETY: ICD-10-CM

## 2024-10-16 DIAGNOSIS — Z00.00 ROUTINE GENERAL MEDICAL EXAMINATION AT A HEALTH CARE FACILITY: Primary | ICD-10-CM

## 2024-10-16 DIAGNOSIS — R14.0 BLOATING: ICD-10-CM

## 2024-10-16 DIAGNOSIS — K63.8219 SMALL INTESTINAL BACTERIAL OVERGROWTH (SIBO): ICD-10-CM

## 2024-10-16 DIAGNOSIS — Z13.6 CARDIOVASCULAR SCREENING; LDL GOAL LESS THAN 160: ICD-10-CM

## 2024-10-16 DIAGNOSIS — Z13.220 SCREENING FOR HYPERLIPIDEMIA: ICD-10-CM

## 2024-10-16 LAB
CHOLEST SERPL-MCNC: 231 MG/DL
FASTING STATUS PATIENT QL REPORTED: ABNORMAL
HDLC SERPL-MCNC: 83 MG/DL
LDLC SERPL CALC-MCNC: 132 MG/DL
NONHDLC SERPL-MCNC: 148 MG/DL
TRIGL SERPL-MCNC: 79 MG/DL

## 2024-10-16 PROCEDURE — 86003 ALLG SPEC IGE CRUDE XTRC EA: CPT | Performed by: PHYSICIAN ASSISTANT

## 2024-10-16 PROCEDURE — 80061 LIPID PANEL: CPT | Performed by: PHYSICIAN ASSISTANT

## 2024-10-16 PROCEDURE — 36415 COLL VENOUS BLD VENIPUNCTURE: CPT | Performed by: PHYSICIAN ASSISTANT

## 2024-10-16 PROCEDURE — 99000 SPECIMEN HANDLING OFFICE-LAB: CPT | Performed by: PHYSICIAN ASSISTANT

## 2024-10-16 PROCEDURE — 90656 IIV3 VACC NO PRSV 0.5 ML IM: CPT | Performed by: PHYSICIAN ASSISTANT

## 2024-10-16 PROCEDURE — 99213 OFFICE O/P EST LOW 20 MIN: CPT | Mod: 25 | Performed by: PHYSICIAN ASSISTANT

## 2024-10-16 PROCEDURE — 90480 ADMN SARSCOV2 VAC 1/ONLY CMP: CPT | Performed by: PHYSICIAN ASSISTANT

## 2024-10-16 PROCEDURE — 99396 PREV VISIT EST AGE 40-64: CPT | Mod: 25 | Performed by: PHYSICIAN ASSISTANT

## 2024-10-16 PROCEDURE — 82172 ASSAY OF APOLIPOPROTEIN: CPT | Mod: 90 | Performed by: PHYSICIAN ASSISTANT

## 2024-10-16 PROCEDURE — 91320 SARSCV2 VAC 30MCG TRS-SUC IM: CPT | Performed by: PHYSICIAN ASSISTANT

## 2024-10-16 PROCEDURE — 83695 ASSAY OF LIPOPROTEIN(A): CPT | Performed by: PHYSICIAN ASSISTANT

## 2024-10-16 PROCEDURE — 90471 IMMUNIZATION ADMIN: CPT | Performed by: PHYSICIAN ASSISTANT

## 2024-10-16 RX ORDER — ALPRAZOLAM 0.25 MG/1
0.25 TABLET ORAL 3 TIMES DAILY PRN
Qty: 20 TABLET | Refills: 0 | Status: SHIPPED | OUTPATIENT
Start: 2024-10-16

## 2024-10-16 SDOH — HEALTH STABILITY: PHYSICAL HEALTH: ON AVERAGE, HOW MANY MINUTES DO YOU ENGAGE IN EXERCISE AT THIS LEVEL?: 40 MIN

## 2024-10-16 SDOH — HEALTH STABILITY: PHYSICAL HEALTH: ON AVERAGE, HOW MANY DAYS PER WEEK DO YOU ENGAGE IN MODERATE TO STRENUOUS EXERCISE (LIKE A BRISK WALK)?: 5 DAYS

## 2024-10-16 ASSESSMENT — ASTHMA QUESTIONNAIRES

## 2024-10-16 ASSESSMENT — SOCIAL DETERMINANTS OF HEALTH (SDOH): HOW OFTEN DO YOU GET TOGETHER WITH FRIENDS OR RELATIVES?: ONCE A WEEK

## 2024-10-16 NOTE — PATIENT INSTRUCTIONS
Patient Education   Preventive Care Advice   This is general advice given by our system to help you stay healthy. However, your care team may have specific advice just for you. Please talk to your care team about your preventive care needs.  Nutrition  Eat 5 or more servings of fruits and vegetables each day.  Try wheat bread, brown rice and whole grain pasta (instead of white bread, rice, and pasta).  Get enough calcium and vitamin D. Check the label on foods and aim for 100% of the RDA (recommended daily allowance).  Lifestyle  Exercise at least 150 minutes each week  (30 minutes a day, 5 days a week).  Do muscle strengthening activities 2 days a week. These help control your weight and prevent disease.  No smoking.  Wear sunscreen to prevent skin cancer.  Have a dental exam and cleaning every 6 months.  Yearly exams  See your health care team every year to talk about:  Any changes in your health.  Any medicines your care team has prescribed.  Preventive care, family planning, and ways to prevent chronic diseases.  Shots (vaccines)   HPV shots (up to age 26), if you've never had them before.  Hepatitis B shots (up to age 59), if you've never had them before.  COVID-19 shot: Get this shot when it's due.  Flu shot: Get a flu shot every year.  Tetanus shot: Get a tetanus shot every 10 years.  Pneumococcal, hepatitis A, and RSV shots: Ask your care team if you need these based on your risk.  Shingles shot (for age 50 and up)  General health tests  Diabetes screening:  Starting at age 35, Get screened for diabetes at least every 3 years.  If you are younger than age 35, ask your care team if you should be screened for diabetes.  Cholesterol test: At age 39, start having a cholesterol test every 5 years, or more often if advised.  Bone density scan (DEXA): At age 50, ask your care team if you should have this scan for osteoporosis (brittle bones).  Hepatitis C: Get tested at least once in your life.  STIs (sexually  transmitted infections)  Before age 24: Ask your care team if you should be screened for STIs.  After age 24: Get screened for STIs if you're at risk. You are at risk for STIs (including HIV) if:  You are sexually active with more than one person.  You don't use condoms every time.  You or a partner was diagnosed with a sexually transmitted infection.  If you are at risk for HIV, ask about PrEP medicine to prevent HIV.  Get tested for HIV at least once in your life, whether you are at risk for HIV or not.  Cancer screening tests  Cervical cancer screening: If you have a cervix, begin getting regular cervical cancer screening tests starting at age 21.  Breast cancer scan (mammogram): If you've ever had breasts, begin having regular mammograms starting at age 40. This is a scan to check for breast cancer.  Colon cancer screening: It is important to start screening for colon cancer at age 45.  Have a colonoscopy test every 10 years (or more often if you're at risk) Or, ask your provider about stool tests like a FIT test every year or Cologuard test every 3 years.  To learn more about your testing options, visit:   .  For help making a decision, visit:   https://bit.ly/ce29703.  Prostate cancer screening test: If you have a prostate, ask your care team if a prostate cancer screening test (PSA) at age 55 is right for you.  Lung cancer screening: If you are a current or former smoker ages 50 to 80, ask your care team if ongoing lung cancer screenings are right for you.  For informational purposes only. Not to replace the advice of your health care provider. Copyright   2023 [employer]. All rights reserved. Clinically reviewed by the [facility] Transitions Program. [employer] 383501 - REV 01/24.

## 2024-10-16 NOTE — PROGRESS NOTES
"Preventive Care Visit  [facility] ENDER Dyer PA-C, Family Medicine  Oct 16, 2024      Assessment & Plan     Routine general medical examination at a health care facility  Overall healthy   - Lipid panel reflex to direct LDL Fasting; Future  - Lipid panel reflex to direct LDL Fasting    Bloating  May be due to sibo.  Ok to repeat the antibiotics .  If still persisting will have her see GI    Anxiety  Stable.    - ALPRAZolam (XANAX) 0.25 MG tablet; Take 1 tablet (0.25 mg) by mouth 3 times daily as needed for anxiety. For travel and PRN    Small intestinal bacterial overgrowth (SIBO)  As above  - rifaximin (XIFAXAN) 550 MG TABS tablet; Take 1 tablet (550 mg) by mouth 3 times daily for 14 days.  - Allergy adult food panel; Future  - Adult GI  Referral - Consult Only; Future  - Colonoscopy Screening  Referral; Future  - Allergy adult food panel    Family history of osteoporosis  Pt aware will need to pay out of pocket  - DX Bone Density; Future    Rash  As above  - Allergy adult food panel; Future  - Allergy adult food panel    CARDIOVASCULAR SCREENING; LDL GOAL LESS THAN 160    - Apolipoprotein B; Future  - Apolipoprotein B    Screening for hyperlipidemia  Follow up as needed  - Lipoprotein (a)            BMI  Estimated body mass index is 26.11 kg/m  as calculated from the following:    Height as of this encounter: 1.683 m (5' 6.25\").    Weight as of this encounter: 73.9 kg (163 lb).   Weight management plan: no concerns, continue diet and exercise     Counseling  Appropriate preventive services were addressed with this patient via screening, questionnaire, or discussion as appropriate for fall prevention, nutrition, physical activity, Tobacco-use cessation, social engagement, weight loss and cognition.  Checklist reviewing preventive services available has been given to the patient.  Reviewed patient's diet, addressing concerns and/or questions.   She is at risk for " psychosocial distress and has been provided with information to reduce risk.           Cuco Lauren is a 44 year old, presenting for the following:  Physical        10/16/2024     8:25 AM   Additional Questions   Roomed by Carrie   Accompanied by self        Health Care Directive  Patient does not have a Health Care Directive or Living Will: Discussed advance care planning with patient; however, patient declined at this time.    HPI    SIBO discussion and colonoscopy referral   Has had stomach issues for years.  Lots of bloating, worse in afternoon.  Gets stomach pain.  Doesn't have a sold bm and then some diarrhea.   Has tried different diets.  Does feel eating and drinking helps the discomfort but now gaining weight.  [facility]ent to [facility] evaluation and the carbohydrate test was positive so was dx with SIBO.  Tried of suplements and that didn't help.  Took an antibiotics and it helped.  Took rifaximin in Feb.  Feels so much better afterwards.  Starting to have symptoms again and would like to repeat the antibiotics .  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         Gets rash all over hands that seemed to be related to eggs.    Has hx of iron def but has felt good so is currently not taking iron.      Has family hx of high cholesterol and is worried about this.  Had high apo a last year with unremarkable lipid.  Her The 10-year ASCVD risk score (Beulah FAULKNER, et al., 2019) is: 0.5%    Values used to calculate the score:      Age: 44 years      Sex: Female      Is Non- : No      Diabetic: No      Tobacco smoker: No      Systolic Blood Pressure: 123 mmHg      Is BP treated: No      HDL Cholesterol: 80 mg/dL      Total Cholesterol: 213 mg/dL         Mom has osteoporosis and is very active so she would like a dexa scan.             10/16/2024   General  Health   How would you rate your overall physical health? Excellent   Feel stress (tense, anxious, or unable to sleep) Only a little      (!) STRESS CONCERN      10/16/2024   Nutrition   Three or more servings of calcium each day? Yes   Diet: Other   If other, please elaborate: Have some restrictions that cause stomach troubles including most dairy, eggs, some nuts   How many servings of fruit and vegetables per day? (!) 2-3   How many sweetened beverages each day? 0-1            10/16/2024   Exercise   Days per week of moderate/strenous exercise 5 days   Average minutes spent exercising at this level 40 min            10/16/2024   Social Factors   Frequency of gathering with friends or relatives Once a week   Worry food won't last until get money to buy more No   Food not last or not have enough money for food? No   Do you have housing? (Housing is defined as stable permanent housing and does not include staying ouside in a car, in a tent, in an abandoned building, in an overnight shelter, or couch-surfing.) Yes   Are you worried about losing your housing? No   Lack of transportation? No   Unable to get utilities (heat,electricity)? No            10/16/2024   Dental   Dentist two times every year? Yes            10/16/2024   TB Screening   Were you born outside of the [place]? No            Today's PHQ-2 Score:       10/16/2024     7:13 AM   PHQ-2 ( 1999 [employer])   Q1: Little interest or pleasure in doing things 0   Q2: Feeling down, depressed or hopeless 0   PHQ-2 Score 0   Q1: Little interest or pleasure in doing things Not at all   Q2: Feeling down, depressed or hopeless Not at all   PHQ-2 Score 0           10/16/2024   Substance Use   Alcohol more than 3/day or more than 7/wk No   Do you use any other substances recreationally? No        Social History     Tobacco Use    Smoking status: Never     Passive exposure: Never    Smokeless tobacco: Never   Substance Use Topics    Alcohol use: Yes     Comment: occasional     "Drug use: No           8/1/2023   LAST FHS-7 RESULTS   1st degree relative breast or ovarian cancer No   Any relative bilateral breast cancer No   Any male have breast cancer No   Any ONE woman have BOTH breast AND ovarian cancer No   Any woman with breast cancer before 50yrs No   2 or more relatives with breast AND/OR ovarian cancer No   2 or more relatives with breast AND/OR bowel cancer No           Mammogram Screening - Mammogram every 1-2 years updated in Health Maintenance based on mutual decision making        10/16/2024   STI Screening   New sexual partner(s) since last STI/HIV test? No        History of abnormal Pap smear: YES - reflected in Problem List and Health Maintenance accordingly        Latest Ref Rng & Units 7/24/2023     9:22 AM 7/12/2019     9:10 AM 6/12/2018     4:16 PM   PAP / HPV   PAP  Negative for Intraepithelial Lesion or Malignancy (NILM)      PAP (Historical)   NIL  NIL    HPV 16 DNA Negative Negative  Negative     HPV 18 DNA Negative Negative  Negative     Other HR HPV Negative Negative  Negative       ASCVD Risk   The 10-year ASCVD risk score (Beulah FAULKNER, et al., 2019) is: 0.5%    Values used to calculate the score:      Age: 44 years      Sex: Female      Is Non- : No      Diabetic: No      Tobacco smoker: No      Systolic Blood Pressure: 123 mmHg      Is BP treated: No      HDL Cholesterol: 80 mg/dL      Total Cholesterol: 213 mg/dL        10/16/2024   Contraception/Family Planning   Questions about contraception or family planning No           Reviewed and updated as needed this visit by Provider                             Objective    Exam  /80   Pulse 56   Temp 97.7  F (36.5  C) (Temporal)   Resp 17   Ht 1.683 m (5' 6.25\")   Wt 73.9 kg (163 lb)   LMP 09/30/2024 (Exact Date)   SpO2 100%   BMI 26.11 kg/m     Estimated body mass index is 26.11 kg/m  as calculated from the following:    Height as of this encounter: 1.683 m (5' 6.25\").    " Weight as of this encounter: 73.9 kg (163 lb).    Physical Exam  Constitutional:       General: She is not in acute distress.     Appearance: She is well-developed.   HENT:      Right Ear: Tympanic membrane, ear canal and external ear normal.      Left Ear: Tympanic membrane, ear canal and external ear normal.      Mouth/Throat:      Pharynx: No oropharyngeal exudate.   Eyes:      Conjunctiva/sclera: Conjunctivae normal.   Neck:      Thyroid: No thyromegaly.   Cardiovascular:      Rate and Rhythm: Normal rate and regular rhythm.      Heart sounds: Normal heart sounds.   Pulmonary:      Effort: Pulmonary effort is normal.      Breath sounds: Normal breath sounds.   Abdominal:      General: Bowel sounds are normal.      Palpations: Abdomen is soft.      Tenderness: There is abdominal tenderness (mild generalized).   Lymphadenopathy:      Cervical: No cervical adenopathy.   Skin:     General: Skin is warm and dry.      Findings: Rash (faint red rash on both hands) present.   Neurological:      Mental Status: She is alert and oriented to person, place, and time.   Psychiatric:         Behavior: Behavior normal.             Signed Electronically by: Makenna Dyer PA-C

## 2024-10-17 ENCOUNTER — PATIENT OUTREACH (OUTPATIENT)
Dept: CARE COORDINATION | Facility: CLINIC | Age: 44
End: 2024-10-17
Payer: COMMERCIAL

## 2024-10-17 LAB
ALMOND IGE QN: <0.1 KU(A)/L
APO A-I SERPL-MCNC: <6 MG/DL
CASHEW NUT IGE QN: <0.1 KU(A)/L
CODFISH IGE QN: <0.1 KU(A)/L
COW MILK IGE QN: <0.1 KU(A)/L
EGG WHITE IGE QN: <0.1 KU(A)/L
HAZELNUT IGE QN: <0.1 KU(A)/L
IGE SERPL-ACNC: 15 KU/L (ref 0–114)
PEANUT IGE QN: <0.1 KU(A)/L
SALMON IGE QN: <0.1 KU(A)/L
SCALLOP IGE QN: <0.1 KU(A)/L
SESAME SEED IGE QN: <0.1 KU(A)/L
SHRIMP IGE QN: <0.1 KU(A)/L
SOYBEAN IGE QN: <0.1 KU(A)/L
TUNA IGE QN: <0.1 KU(A)/L
WALNUT IGE QN: <0.1 KU(A)/L
WHEAT IGE QN: <0.1 KU(A)/L

## 2024-10-18 LAB — APO B100 SERPL-MCNC: 99 MG/DL

## 2025-03-03 ENCOUNTER — MYC MEDICAL ADVICE (OUTPATIENT)
Dept: FAMILY MEDICINE | Facility: CLINIC | Age: 45
End: 2025-03-03
Payer: COMMERCIAL

## 2025-03-03 DIAGNOSIS — K63.8219 SMALL INTESTINAL BACTERIAL OVERGROWTH (SIBO): ICD-10-CM

## 2025-07-21 ENCOUNTER — PATIENT OUTREACH (OUTPATIENT)
Dept: CARE COORDINATION | Facility: CLINIC | Age: 45
End: 2025-07-21
Payer: COMMERCIAL